# Patient Record
Sex: MALE | Race: WHITE | NOT HISPANIC OR LATINO | Employment: OTHER | URBAN - NONMETROPOLITAN AREA
[De-identification: names, ages, dates, MRNs, and addresses within clinical notes are randomized per-mention and may not be internally consistent; named-entity substitution may affect disease eponyms.]

---

## 2017-12-20 ENCOUNTER — TRANSFERRED RECORDS (OUTPATIENT)
Dept: HEALTH INFORMATION MANAGEMENT | Facility: HOSPITAL | Age: 67
End: 2017-12-20

## 2019-10-21 NOTE — PROGRESS NOTES
Subjective     Sammy Mccoy is a 69 year old male who presents to clinic today for the following health issues:    HPI   New Patient/Transfer of Care  Hyperlipidemia Follow-Up      Are you having any of the following symptoms? (Select all that apply)  No complaints of shortness of breath, chest pain or pressure.  No increased sweating or nausea with activity.  No left-sided neck or arm pain.  No complaints of pain in calves when walking 1-2 blocks.    Are you regularly taking any medication or supplement to lower your cholesterol?   Yes- Lipitor     Are you having muscle aches or other side effects that you think could be caused by your cholesterol lowering medication?  No      Hypertension Follow-up      Do you check your blood pressure regularly outside of the clinic? Yes     Are you following a low salt diet? No    Are your blood pressures ever more than 140 on the top number (systolic) OR more   than 90 on the bottom number (diastolic), for example 140/90? No    Sammy presents today to establish care.  He has no complaints today.  He has a history of HTN and hyperlipidemia along with mild neutropenia.    There is no problem list on file for this patient.    Past Surgical History:   Procedure Laterality Date     BIOPSY TONGUE       colonoscopy  12/20/2017 2002,2007,2012,2017- Dr. Holloway       PROSTATE WITH BIOPSY         Social History     Tobacco Use     Smoking status: Never Smoker     Smokeless tobacco: Never Used   Substance Use Topics     Alcohol use: Yes     Comment: weekly     Family History   Problem Relation Age of Onset     Coronary Artery Disease Mother 85     Alzheimer Disease Father 93         Current Outpatient Medications   Medication Sig Dispense Refill     aspirin (ASA) 81 MG chewable tablet Take 81 mg by mouth daily       Glucosamine HCl (GLUCOSAMINE PO) Take 2 tablets by mouth daily       lisinopril (PRINIVIL/ZESTRIL) 20 MG tablet Take 1 tablet (20 mg) by mouth daily 90 tablet 3      "simvastatin (ZOCOR) 20 MG tablet Take 1 tablet (20 mg) by mouth daily 90 tablet 3     No Known Allergies  BP Readings from Last 3 Encounters:   10/29/19 130/72    Wt Readings from Last 3 Encounters:   10/29/19 72.6 kg (160 lb)                 Reviewed and updated as needed this visit by Provider         Review of Systems   ROS COMP: Constitutional, HEENT, cardiovascular, pulmonary, GI, , musculoskeletal, neuro, skin, endocrine and psych systems are negative, except as otherwise noted.      Objective    /72 (BP Location: Left arm, Patient Position: Chair, Cuff Size: Adult Regular)   Pulse 79   Temp 97.7  F (36.5  C) (Tympanic)   Ht 1.689 m (5' 6.5\")   Wt 72.6 kg (160 lb)   SpO2 97%   BMI 25.44 kg/m    Body mass index is 25.44 kg/m .  Physical Exam   GENERAL: healthy, alert and no distress  EYES: Eyes grossly normal to inspection, PERRL and conjunctivae and sclerae normal  HENT: ear canals and TM's normal, nose and mouth without ulcers or lesions  NECK: no adenopathy, no asymmetry, masses, or scars and thyroid normal to palpation  RESP: lungs clear to auscultation - no rales, rhonchi or wheezes  CV: regular rate and rhythm, normal S1 S2, no S3 or S4, no murmur, click or rub, no peripheral edema and peripheral pulses strong  ABDOMEN: soft, nontender, no hepatosplenomegaly, no masses and bowel sounds normal  MS: no gross musculoskeletal defects noted, no edema  SKIN: no suspicious lesions or rashes  NEURO: Normal strength and tone, mentation intact and speech normal  PSYCH: mentation appears normal, affect normal/bright    Diagnostic Test Results:  No results found for this or any previous visit (from the past 24 hour(s)).  No results found for this or any previous visit.        Assessment & Plan   Problem List Items Addressed This Visit     None      Visit Diagnoses     Essential hypertension    -  Primary    Relevant Medications    lisinopril (PRINIVIL/ZESTRIL) 20 MG tablet    Hyperlipidemia LDL goal " <130        Relevant Medications    simvastatin (ZOCOR) 20 MG tablet    Other Relevant Orders    Comprehensive metabolic panel (Completed)    Lipid Profile (Chol, Trig, HDL, LDL calc) (Completed)    Screening for prostate cancer        Relevant Orders    PSA, screen (Completed)    Other neutropenia (H)        Relevant Orders    CBC with platelets and differential (Completed)               Linwood Russell DO  [facility]

## 2019-10-29 ENCOUNTER — TELEPHONE (OUTPATIENT)
Dept: INTERNAL MEDICINE | Facility: OTHER | Age: 69
End: 2019-10-29

## 2019-10-29 ENCOUNTER — OFFICE VISIT (OUTPATIENT)
Dept: INTERNAL MEDICINE | Facility: OTHER | Age: 69
End: 2019-10-29
Attending: INTERNAL MEDICINE
Payer: COMMERCIAL

## 2019-10-29 VITALS
HEIGHT: 67 IN | TEMPERATURE: 97.7 F | WEIGHT: 160 LBS | SYSTOLIC BLOOD PRESSURE: 130 MMHG | BODY MASS INDEX: 25.11 KG/M2 | HEART RATE: 79 BPM | OXYGEN SATURATION: 97 % | DIASTOLIC BLOOD PRESSURE: 72 MMHG

## 2019-10-29 DIAGNOSIS — Z12.5 SCREENING FOR PROSTATE CANCER: ICD-10-CM

## 2019-10-29 DIAGNOSIS — D70.9 NEUTROPENIA, UNSPECIFIED TYPE (H): Primary | ICD-10-CM

## 2019-10-29 DIAGNOSIS — D70.8 OTHER NEUTROPENIA (H): ICD-10-CM

## 2019-10-29 DIAGNOSIS — E78.5 HYPERLIPIDEMIA LDL GOAL <130: ICD-10-CM

## 2019-10-29 DIAGNOSIS — I10 ESSENTIAL HYPERTENSION: Primary | ICD-10-CM

## 2019-10-29 LAB
ALBUMIN SERPL-MCNC: 4.5 G/DL (ref 3.4–5)
ALP SERPL-CCNC: 63 U/L (ref 40–150)
ALT SERPL W P-5'-P-CCNC: 37 U/L (ref 0–70)
ANION GAP SERPL CALCULATED.3IONS-SCNC: 7 MMOL/L (ref 3–14)
AST SERPL W P-5'-P-CCNC: 31 U/L (ref 0–45)
BASOPHILS # BLD AUTO: 0.1 10E9/L (ref 0–0.2)
BASOPHILS NFR BLD AUTO: 1.7 %
BILIRUB SERPL-MCNC: 0.7 MG/DL (ref 0.2–1.3)
BUN SERPL-MCNC: 11 MG/DL (ref 7–30)
CALCIUM SERPL-MCNC: 9.6 MG/DL (ref 8.5–10.1)
CHLORIDE SERPL-SCNC: 103 MMOL/L (ref 94–109)
CHOLEST SERPL-MCNC: 198 MG/DL
CO2 SERPL-SCNC: 27 MMOL/L (ref 20–32)
CREAT SERPL-MCNC: 0.91 MG/DL (ref 0.66–1.25)
DIFFERENTIAL METHOD BLD: ABNORMAL
EOSINOPHIL # BLD AUTO: 0.2 10E9/L (ref 0–0.7)
EOSINOPHIL NFR BLD AUTO: 5.6 %
ERYTHROCYTE [DISTWIDTH] IN BLOOD BY AUTOMATED COUNT: 12 % (ref 10–15)
GFR SERPL CREATININE-BSD FRML MDRD: 87 ML/MIN/{1.73_M2}
GLUCOSE SERPL-MCNC: 117 MG/DL (ref 70–99)
HCT VFR BLD AUTO: 46 % (ref 40–53)
HDLC SERPL-MCNC: 93 MG/DL
HGB BLD-MCNC: 15.7 G/DL (ref 13.3–17.7)
LDLC SERPL CALC-MCNC: 96 MG/DL
LYMPHOCYTES # BLD AUTO: 1.2 10E9/L (ref 0.8–5.3)
LYMPHOCYTES NFR BLD AUTO: 40.2 %
MCH RBC QN AUTO: 31 PG (ref 26.5–33)
MCHC RBC AUTO-ENTMCNC: 34.1 G/DL (ref 31.5–36.5)
MCV RBC AUTO: 91 FL (ref 78–100)
MONOCYTES # BLD AUTO: 0.4 10E9/L (ref 0–1.3)
MONOCYTES NFR BLD AUTO: 12.6 %
NEUTROPHILS # BLD AUTO: 1.1 10E9/L (ref 1.6–8.3)
NEUTROPHILS NFR BLD AUTO: 39.9 %
NONHDLC SERPL-MCNC: 105 MG/DL
PLATELET # BLD AUTO: 185 10E9/L (ref 150–450)
POTASSIUM SERPL-SCNC: 4.2 MMOL/L (ref 3.4–5.3)
PROT SERPL-MCNC: 7.8 G/DL (ref 6.8–8.8)
PSA SERPL-ACNC: 0.88 UG/L (ref 0–4)
RBC # BLD AUTO: 5.07 10E12/L (ref 4.4–5.9)
SODIUM SERPL-SCNC: 137 MMOL/L (ref 133–144)
TRIGL SERPL-MCNC: 47 MG/DL
WBC # BLD AUTO: 2.9 10E9/L (ref 4–11)

## 2019-10-29 PROCEDURE — G0103 PSA SCREENING: HCPCS | Mod: ZL | Performed by: INTERNAL MEDICINE

## 2019-10-29 PROCEDURE — 80053 COMPREHEN METABOLIC PANEL: CPT | Mod: ZL | Performed by: INTERNAL MEDICINE

## 2019-10-29 PROCEDURE — 80061 LIPID PANEL: CPT | Mod: ZL | Performed by: INTERNAL MEDICINE

## 2019-10-29 PROCEDURE — 36415 COLL VENOUS BLD VENIPUNCTURE: CPT | Mod: ZL | Performed by: INTERNAL MEDICINE

## 2019-10-29 PROCEDURE — G0463 HOSPITAL OUTPT CLINIC VISIT: HCPCS

## 2019-10-29 PROCEDURE — 99204 OFFICE O/P NEW MOD 45 MIN: CPT | Performed by: INTERNAL MEDICINE

## 2019-10-29 PROCEDURE — 85025 COMPLETE CBC W/AUTO DIFF WBC: CPT | Mod: ZL | Performed by: INTERNAL MEDICINE

## 2019-10-29 RX ORDER — ASPIRIN 81 MG/1
81 TABLET, CHEWABLE ORAL DAILY
COMMUNITY

## 2019-10-29 RX ORDER — SIMVASTATIN 20 MG
20 TABLET ORAL DAILY
Qty: 90 TABLET | Refills: 3 | Status: SHIPPED | OUTPATIENT
Start: 2019-10-29 | End: 2020-11-30

## 2019-10-29 RX ORDER — LISINOPRIL 20 MG/1
20 TABLET ORAL DAILY
Qty: 90 TABLET | Refills: 3 | Status: SHIPPED | OUTPATIENT
Start: 2019-10-29 | End: 2020-11-30

## 2019-10-29 RX ORDER — LISINOPRIL 20 MG/1
20 TABLET ORAL DAILY
COMMUNITY
Start: 2017-02-28 | End: 2019-10-29

## 2019-10-29 RX ORDER — SIMVASTATIN 20 MG
20 TABLET ORAL DAILY
COMMUNITY
Start: 2012-08-07 | End: 2019-10-29

## 2019-10-29 ASSESSMENT — MIFFLIN-ST. JEOR: SCORE: 1451.45

## 2019-10-29 ASSESSMENT — PATIENT HEALTH QUESTIONNAIRE - PHQ9: SUM OF ALL RESPONSES TO PHQ QUESTIONS 1-9: 0

## 2019-10-29 ASSESSMENT — ANXIETY QUESTIONNAIRES
3. WORRYING TOO MUCH ABOUT DIFFERENT THINGS: NOT AT ALL
6. BECOMING EASILY ANNOYED OR IRRITABLE: NOT AT ALL
5. BEING SO RESTLESS THAT IT IS HARD TO SIT STILL: NOT AT ALL
4. TROUBLE RELAXING: NOT AT ALL
2. NOT BEING ABLE TO STOP OR CONTROL WORRYING: NOT AT ALL
1. FEELING NERVOUS, ANXIOUS, OR ON EDGE: NOT AT ALL
GAD7 TOTAL SCORE: 0
7. FEELING AFRAID AS IF SOMETHING AWFUL MIGHT HAPPEN: NOT AT ALL

## 2019-10-29 ASSESSMENT — PAIN SCALES - GENERAL: PAINLEVEL: NO PAIN (0)

## 2019-10-29 NOTE — NURSING NOTE
"Chief Complaint   Patient presents with     Establish Care       Initial /72 (BP Location: Left arm, Patient Position: Chair, Cuff Size: Adult Regular)   Pulse 79   Temp 97.7  F (36.5  C) (Tympanic)   Ht 1.689 m (5' 6.5\")   Wt 72.6 kg (160 lb)   SpO2 97%   BMI 25.44 kg/m   Estimated body mass index is 25.44 kg/m  as calculated from the following:    Height as of this encounter: 1.689 m (5' 6.5\").    Weight as of this encounter: 72.6 kg (160 lb).  Medication Reconciliation: complete     MIKHAIL RODRIGUEZ LPN      "

## 2019-10-29 NOTE — TELEPHONE ENCOUNTER
Patient notified of lab results, will see hematology to low WBC. Order pending   MIKHAIL RODRIGUEZ LPN

## 2019-10-30 ENCOUNTER — TRANSFERRED RECORDS (OUTPATIENT)
Dept: HEALTH INFORMATION MANAGEMENT | Facility: HOSPITAL | Age: 69
End: 2019-10-30

## 2019-10-30 ASSESSMENT — ANXIETY QUESTIONNAIRES: GAD7 TOTAL SCORE: 0

## 2019-10-31 ENCOUNTER — TELEPHONE (OUTPATIENT)
Dept: INTERNAL MEDICINE | Facility: OTHER | Age: 69
End: 2019-10-31

## 2019-10-31 NOTE — TELEPHONE ENCOUNTER
"3:18 PM    Reason for Call: Phone Call    Description: Pt is calling to set up an apt with a \"blood doctor\" is what he said. He couldn't give me more information on who and where. Pt was transferred to me and wasn't sure what  He is supposed to do. Please call pt with more specific information on what his next steps are    Was an appointment offered for this call? No  If yes : Appointment type              Date    Preferred method for responding to this message: Telephone Call  What is your phone number ?  [phone] Sammy    If we cannot reach you directly, may we leave a detailed response at the number you provided? Yes    Can this message wait until your PCP/provider returns, if available today? Not applicable, PCP is in    Danae Corbin    "

## 2019-10-31 NOTE — TELEPHONE ENCOUNTER
ONCOLOGY INTAKE: Records Information      APPT INFORMATION:  Referring provider:  Linwood Russell, DO  Referring provider s clinic:  [facility]  Reason for visit/diagnosis:  D70.9 (ICD-10-CM) - Neutropenia, unspecified type (H) Low WBC  Has patient been notified of appointment date and time?: No    RECORDS INFORMATION:  Were the records received with the referral (via [employer])? No, Internal Referral      ADDITIONAL INFORMATION:  LVM and Letter Sent

## 2019-11-01 NOTE — TELEPHONE ENCOUNTER
Spoke with OTILIO in Hematology. They will be reviewing his chart and calling him either Friday or Monday. Patient notified     MIKHAIL RODRIGUEZ LPN

## 2019-12-23 ENCOUNTER — ONCOLOGY VISIT (OUTPATIENT)
Dept: ONCOLOGY | Facility: OTHER | Age: 69
End: 2019-12-23
Attending: INTERNAL MEDICINE
Payer: MEDICARE

## 2019-12-23 VITALS
HEART RATE: 96 BPM | TEMPERATURE: 97.6 F | BODY MASS INDEX: 26.22 KG/M2 | RESPIRATION RATE: 20 BRPM | SYSTOLIC BLOOD PRESSURE: 138 MMHG | HEIGHT: 66 IN | OXYGEN SATURATION: 98 % | WEIGHT: 163.14 LBS | DIASTOLIC BLOOD PRESSURE: 80 MMHG

## 2019-12-23 DIAGNOSIS — R53.83 FATIGUE: Primary | ICD-10-CM

## 2019-12-23 DIAGNOSIS — D70.9 NEUTROPENIA, UNSPECIFIED TYPE (H): ICD-10-CM

## 2019-12-23 LAB
ALBUMIN SERPL-MCNC: 4.5 G/DL (ref 3.4–5)
ALP SERPL-CCNC: 60 U/L (ref 40–150)
ALT SERPL W P-5'-P-CCNC: 31 U/L (ref 0–70)
ANION GAP SERPL CALCULATED.3IONS-SCNC: 6 MMOL/L (ref 3–14)
AST SERPL W P-5'-P-CCNC: 25 U/L (ref 0–45)
BASOPHILS # BLD AUTO: 0 10E9/L (ref 0–0.2)
BASOPHILS NFR BLD AUTO: 0.9 %
BILIRUB SERPL-MCNC: 0.7 MG/DL (ref 0.2–1.3)
BUN SERPL-MCNC: 18 MG/DL (ref 7–30)
CALCIUM SERPL-MCNC: 9.6 MG/DL (ref 8.5–10.1)
CHLORIDE SERPL-SCNC: 104 MMOL/L (ref 94–109)
CO2 SERPL-SCNC: 28 MMOL/L (ref 20–32)
CREAT SERPL-MCNC: 0.79 MG/DL (ref 0.66–1.25)
DIFFERENTIAL METHOD BLD: NORMAL
EOSINOPHIL # BLD AUTO: 0.1 10E9/L (ref 0–0.7)
EOSINOPHIL NFR BLD AUTO: 3 %
ERYTHROCYTE [DISTWIDTH] IN BLOOD BY AUTOMATED COUNT: 11.7 % (ref 10–15)
ERYTHROCYTE [SEDIMENTATION RATE] IN BLOOD BY WESTERGREN METHOD: 4 MM/H (ref 0–20)
GFR SERPL CREATININE-BSD FRML MDRD: >90 ML/MIN/{1.73_M2}
GLUCOSE SERPL-MCNC: 126 MG/DL (ref 70–99)
HCT VFR BLD AUTO: 45.7 % (ref 40–53)
HGB BLD-MCNC: 15.9 G/DL (ref 13.3–17.7)
IMM GRANULOCYTES # BLD: 0 10E9/L (ref 0–0.4)
IMM GRANULOCYTES NFR BLD: 0 %
LDH SERPL L TO P-CCNC: 179 U/L (ref 85–227)
LYMPHOCYTES # BLD AUTO: 1 10E9/L (ref 0.8–5.3)
LYMPHOCYTES NFR BLD AUTO: 21.2 %
MCH RBC QN AUTO: 31.2 PG (ref 26.5–33)
MCHC RBC AUTO-ENTMCNC: 34.8 G/DL (ref 31.5–36.5)
MCV RBC AUTO: 90 FL (ref 78–100)
MONOCYTES # BLD AUTO: 0.4 10E9/L (ref 0–1.3)
MONOCYTES NFR BLD AUTO: 7.8 %
NEUTROPHILS # BLD AUTO: 3.1 10E9/L (ref 1.6–8.3)
NEUTROPHILS NFR BLD AUTO: 67.1 %
NRBC # BLD AUTO: 0 10*3/UL
NRBC BLD AUTO-RTO: 0 /100
PLATELET # BLD AUTO: 193 10E9/L (ref 150–450)
POTASSIUM SERPL-SCNC: 4 MMOL/L (ref 3.4–5.3)
PROT SERPL-MCNC: 7.9 G/DL (ref 6.8–8.8)
RBC # BLD AUTO: 5.09 10E12/L (ref 4.4–5.9)
RETICS # AUTO: 68.7 10E9/L (ref 25–95)
RETICS/RBC NFR AUTO: 1.4 % (ref 0.5–2)
SODIUM SERPL-SCNC: 138 MMOL/L (ref 133–144)
TSH SERPL DL<=0.005 MIU/L-ACNC: 1.82 MU/L (ref 0.4–4)
WBC # BLD AUTO: 4.6 10E9/L (ref 4–11)

## 2019-12-23 PROCEDURE — 86664 EPSTEIN-BARR NUCLEAR ANTIGEN: CPT | Mod: ZL | Performed by: INTERNAL MEDICINE

## 2019-12-23 PROCEDURE — 85652 RBC SED RATE AUTOMATED: CPT | Mod: ZL | Performed by: INTERNAL MEDICINE

## 2019-12-23 PROCEDURE — 82784 ASSAY IGA/IGD/IGG/IGM EACH: CPT | Mod: ZL | Performed by: INTERNAL MEDICINE

## 2019-12-23 PROCEDURE — 83615 LACTATE (LD) (LDH) ENZYME: CPT | Mod: ZL | Performed by: INTERNAL MEDICINE

## 2019-12-23 PROCEDURE — G0463 HOSPITAL OUTPT CLINIC VISIT: HCPCS

## 2019-12-23 PROCEDURE — 82746 ASSAY OF FOLIC ACID SERUM: CPT | Mod: ZL | Performed by: INTERNAL MEDICINE

## 2019-12-23 PROCEDURE — 84443 ASSAY THYROID STIM HORMONE: CPT | Mod: ZL | Performed by: INTERNAL MEDICINE

## 2019-12-23 PROCEDURE — 40000847 ZZHCL STATISTIC MORPHOLOGY W/INTERP HISTOLOGY TC 85060: Mod: ZL | Performed by: INTERNAL MEDICINE

## 2019-12-23 PROCEDURE — 36415 COLL VENOUS BLD VENIPUNCTURE: CPT | Mod: ZL | Performed by: INTERNAL MEDICINE

## 2019-12-23 PROCEDURE — 85025 COMPLETE CBC W/AUTO DIFF WBC: CPT | Mod: ZL | Performed by: INTERNAL MEDICINE

## 2019-12-23 PROCEDURE — 86665 EPSTEIN-BARR CAPSID VCA: CPT | Mod: ZL | Performed by: INTERNAL MEDICINE

## 2019-12-23 PROCEDURE — 86431 RHEUMATOID FACTOR QUANT: CPT | Mod: ZL | Performed by: INTERNAL MEDICINE

## 2019-12-23 PROCEDURE — 85045 AUTOMATED RETICULOCYTE COUNT: CPT | Mod: ZL | Performed by: INTERNAL MEDICINE

## 2019-12-23 PROCEDURE — 82607 VITAMIN B-12: CPT | Mod: ZL | Performed by: INTERNAL MEDICINE

## 2019-12-23 PROCEDURE — 86038 ANTINUCLEAR ANTIBODIES: CPT | Mod: ZL | Performed by: INTERNAL MEDICINE

## 2019-12-23 PROCEDURE — 00000402 ZZHCL STATISTIC TOTAL PROTEIN: Mod: ZL | Performed by: INTERNAL MEDICINE

## 2019-12-23 PROCEDURE — 84165 PROTEIN E-PHORESIS SERUM: CPT | Mod: ZL | Performed by: INTERNAL MEDICINE

## 2019-12-23 PROCEDURE — 80053 COMPREHEN METABOLIC PANEL: CPT | Mod: ZL | Performed by: INTERNAL MEDICINE

## 2019-12-23 PROCEDURE — 99203 OFFICE O/P NEW LOW 30 MIN: CPT | Performed by: INTERNAL MEDICINE

## 2019-12-23 PROCEDURE — 86334 IMMUNOFIX E-PHORESIS SERUM: CPT | Mod: ZL | Performed by: INTERNAL MEDICINE

## 2019-12-23 ASSESSMENT — PATIENT HEALTH QUESTIONNAIRE - PHQ9: SUM OF ALL RESPONSES TO PHQ QUESTIONS 1-9: 0

## 2019-12-23 ASSESSMENT — PAIN SCALES - GENERAL: PAINLEVEL: NO PAIN (0)

## 2019-12-23 ASSESSMENT — MIFFLIN-ST. JEOR: SCORE: 1461.72

## 2019-12-23 NOTE — NURSING NOTE
"Chief Complaint   Patient presents with     Consult     Consult for neutropenia        Initial /80   Pulse 96   Temp 97.6  F (36.4  C) (Tympanic)   Resp 20   Ht 1.683 m (5' 6.25\")   Wt 74 kg (163 lb 2.3 oz)   SpO2 98%   BMI 26.13 kg/m   Estimated body mass index is 26.13 kg/m  as calculated from the following:    Height as of this encounter: 1.683 m (5' 6.25\").    Weight as of this encounter: 74 kg (163 lb 2.3 oz).  Medication Reconciliation: complete.  Immunizations and advance directives status reviewed. Pain scale =0 , PHQ-9 =0.    Patient was assessed using the NCCN psychosocial distress thermometer. Patient rated the score as a 3. Patient rated current stressors as seeing DR today. Stressors will be brought to the attention of provider or Oncology RN Care Coordinator for a score of 6 or greater or per nurses discretion.                     Urvashi Neff LPN    "

## 2019-12-24 LAB
FOLATE SERPL-MCNC: 38.8 NG/ML
VIT B12 SERPL-MCNC: 488 PG/ML (ref 193–986)

## 2019-12-24 NOTE — PROGRESS NOTES
Visit Date:   12/23/2019      HEMATOLOGY/ONCOLOGY CONSULTATION      REASON FOR CONSULTATION:  Leukopenia.      REQUESTING PHYSICIAN:  Linwood Russell DO      HISTORY OF PRESENT ILLNESS:  Mr. Mccoy is a 69-year-old white male with a history of hypertension, hyperlipidemia with a previous history of neutropenia.  He had originally been evaluated by a previous doctor, Dr. Perez, for neutropenia who referred the patient to Dr. Maurice Hughes hematologist/oncologist at [facility] who saw the patient on 05/31/2016.  At that time his white count was 3.2 with an absolute neutrophil count of 1.2.  He recommended bone marrow aspiration biopsy and this came back totally normal.  There was no abnormal cytogenetics, no abnormal flow cytometry.  FISH results were all negative for MDS.  Dr. Hughes felt that this was a benign condition; he called it benign chronic neutropenia and felt that this can wax and wane between normal white counts and borderline mild neutropenic counts.  The patient apparently established care with Dr. Russell as his new primary care physician and a CBC was drawn on 10/29/2019.  The white count was 2.9 with a neutrophil count of 1.1, H and H 15.7 and 46.0, platelet count was 185.  The patient is essentially asymptomatic.  Denies any fevers, night sweats, weight loss.  There are no reports of fatigue, malaise, joint pains.      PAST MEDICAL HISTORY:  Significant for hyperlipidemia, hypertension, benign colon polyps, prostate nodule with normal biopsy.      ALLERGIES:  HE HAS NO KNOWN DRUG ALLERGIES.      MEDICATIONS:   1.  He is on Lisinopril 20 mg daily.   2.  Zocor 20 mg daily.   3.  Aspirin 81 mg daily.   4.  Glucosamine 2 tablets by mouth daily.      SOCIAL HISTORY:  Tobacco is negative.  Alcohol is negative.  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      FAMILY HISTORY:  Noncontributory.      REVIEW OF SYSTEMS:   CENTRAL NERVOUS SYSTEM:  Negative for headache,  change in mental status.   ENT:  Negative for hearing loss.   RESPIRATORY:  Negative for shortness of breath, cough, hemoptysis.   CARDIAC:  Negative chest pain, palpitations, orthopnea, PND, ankle edema.   GASTROINTESTINAL:  Negative for change in bowel habits, bright red blood per rectum, constipation.   MUSCULOSKELETAL:  Unremarkable.   GENITOURINARY:  Negative for nocturia, urgency, frequency.   HEMATOLOGIC:  Negative for easy bruisability, epistaxis.      PHYSICAL EXAMINATION:   GENERAL:  He is a middle-aged white male in no acute distress.   VITAL SIGNS:  Blood pressure 130/80, pulse 76, respirations 20, temperature 97.6.   HEENT:  Atraumatic, normocephalic.  Oropharynx nonerythematous.   NECK:  Supple, no thyromegaly.   LUNGS:  Clear to auscultation and percussion.   HEART:  Regular rhythm, S1, S2 normal.   ABDOMEN:  Soft, normoactive bowel sounds.  No mass, nontender.   LYMPHATICS:  No cervical, supraclavicular, axillary, inguinal nodes.   EXTREMITIES:  Without edema.   NEUROLOGIC:  Nonfocal.      LABORATORY DATA:  Laboratories reveal CBC:  White count 4.6, H and H 15.9 and 45.7, platelet count 193.  Neutrophil count is 3.1.  BUN 18, creatinine 0.79.  LFTs are normal.      IMPRESSION:  Chronic benign neutropenia.  The patient had a previous bone marrow in 2016, which was negative.  Given the fact he has had neutropenia in the past, we would like to complete the workup by reviewing the peripheral blood smear, obtaining sed rate, rheumatoid factor, LUCIO, serum protein electrophoresis, B12, folic acid, retic count, TSH, Jeny-Barr virus profile.  Also obtain a CT abdomen and pelvis to rule out splenomegaly.  Otherwise, if the above workup is negative the patient likely has chronic benign neutropenia and may have periods where his white count drops but it appears to be a cyclic finding and no further workup will be necessary.      Seventy minutes was spent with the patient, greater than half the time was spent  in counseling and coordination of care.         JUANCARLOS REAL MD             D: 2019   T: 2019   MT: LILLIAN      Name:     CARRI RILEY   MRN:      [id number]        Account:      [id number]   :      1950           Visit Date:   2019      Document: [id number]       cc: Linwood Russell DO

## 2019-12-26 LAB
ALBUMIN SERPL ELPH-MCNC: 4.9 G/DL (ref 3.7–5.1)
ALPHA1 GLOB SERPL ELPH-MCNC: 0.3 G/DL (ref 0.2–0.4)
ALPHA2 GLOB SERPL ELPH-MCNC: 0.6 G/DL (ref 0.5–0.9)
ANA SER QL IF: NEGATIVE
B-GLOBULIN SERPL ELPH-MCNC: 0.8 G/DL (ref 0.6–1)
COPATH REPORT: NORMAL
EBV NA IGG SER QL IA: >8 AI (ref 0–0.8)
EBV VCA IGG SER QL IA: >8 AI (ref 0–0.8)
EBV VCA IGM SER QL IA: <0.2 AI (ref 0–0.8)
GAMMA GLOB SERPL ELPH-MCNC: 0.8 G/DL (ref 0.7–1.6)
IGA SERPL-MCNC: 258 MG/DL (ref 84–499)
IGG SERPL-MCNC: 882 MG/DL (ref 610–1616)
IGM SERPL-MCNC: 55 MG/DL (ref 35–242)
M PROTEIN SERPL ELPH-MCNC: 0 G/DL
PROT PATTERN SERPL ELPH-IMP: NORMAL
PROT PATTERN SERPL IFE-IMP: NORMAL
RHEUMATOID FACT SER NEPH-ACNC: 10 IU/ML (ref 0–20)

## 2020-01-06 ENCOUNTER — HOSPITAL ENCOUNTER (OUTPATIENT)
Dept: CT IMAGING | Facility: HOSPITAL | Age: 70
Discharge: HOME | End: 2020-01-06
Attending: INTERNAL MEDICINE | Admitting: INTERNAL MEDICINE
Payer: MEDICARE

## 2020-01-06 DIAGNOSIS — D70.9 NEUTROPENIA, UNSPECIFIED TYPE (H): ICD-10-CM

## 2020-01-06 DIAGNOSIS — R53.83 FATIGUE: ICD-10-CM

## 2020-01-06 PROCEDURE — 25500064 ZZH RX 255 OP 636: Performed by: RADIOLOGY

## 2020-01-06 PROCEDURE — 74177 CT ABD & PELVIS W/CONTRAST: CPT | Mod: TC

## 2020-01-06 RX ORDER — IOPAMIDOL 612 MG/ML
100 INJECTION, SOLUTION INTRAVASCULAR ONCE
Status: COMPLETED | OUTPATIENT
Start: 2020-01-06 | End: 2020-01-06

## 2020-01-06 RX ADMIN — DIATRIZOATE MEGLUMINE AND DIATRIZOATE SODIUM 30 ML: 660; 100 SOLUTION ORAL; RECTAL at 11:38

## 2020-01-06 RX ADMIN — IOPAMIDOL 100 ML: 612 INJECTION, SOLUTION INTRAVENOUS at 11:39

## 2020-01-27 ENCOUNTER — ONCOLOGY VISIT (OUTPATIENT)
Dept: ONCOLOGY | Facility: OTHER | Age: 70
End: 2020-01-27
Attending: INTERNAL MEDICINE
Payer: COMMERCIAL

## 2020-01-27 VITALS
HEART RATE: 71 BPM | HEIGHT: 66 IN | SYSTOLIC BLOOD PRESSURE: 140 MMHG | WEIGHT: 158.95 LBS | BODY MASS INDEX: 25.55 KG/M2 | TEMPERATURE: 97.3 F | DIASTOLIC BLOOD PRESSURE: 70 MMHG | OXYGEN SATURATION: 98 %

## 2020-01-27 DIAGNOSIS — D70.9 NEUTROPENIA, UNSPECIFIED TYPE (H): Primary | ICD-10-CM

## 2020-01-27 PROCEDURE — G0463 HOSPITAL OUTPT CLINIC VISIT: HCPCS

## 2020-01-27 PROCEDURE — 99213 OFFICE O/P EST LOW 20 MIN: CPT | Performed by: INTERNAL MEDICINE

## 2020-01-27 ASSESSMENT — MIFFLIN-ST. JEOR: SCORE: 1438.75

## 2020-01-27 NOTE — PROGRESS NOTES
"Chief Complaint   Patient presents with     RECHECK     Neutropenia       Initial BP (!) 140/70 (Patient Position: Sitting)   Pulse 71   Temp 97.3  F (36.3  C)   Ht 1.676 m (5' 6\")   Wt 72.1 kg (158 lb 15.2 oz)   SpO2 98%   BMI 25.66 kg/m   Estimated body mass index is 25.66 kg/m  as calculated from the following:    Height as of this encounter: 1.676 m (5' 6\").    Weight as of this encounter: 72.1 kg (158 lb 15.2 oz).  Medication Reconciliation:done     Wendy Romero LPN    "

## 2020-01-27 NOTE — PROGRESS NOTES
Visit Date:   01/27/2020      HISTORY OF PRESENT ILLNESS:  Mr. Mccoy returns for followup of leukopenia.  We had seen him in consultation at the request of Dr. Russell on 12/23/2019.  At that time he was a 69-year-old white male with a history of hypertension and hyperlipidemia with a previous history of neutropenia.  He had originally been evaluated by his previous doctor, Dr. Perez, for neutropenia.  We referred the patient to Dr. Maurice Hughes, hematologist/oncologist, and he saw the patient 05/31/2016.  At that time, his white count was 3.2 with absolute neutrophil count of 1.2.  He recommended a bone marrow aspiration biopsy.  This came back totally normal.  There was no abnormal cytogenetics, normal flow cytometry.  FISH results were all negative for MDS.  Dr. Hughes felt this was a benign condition.  He called it benign chronic neutropenia, but this can wax and wane between normal white counts and borderline neutropenic counts.  The patient apparently established care with Dr. Russell as his new primary care physician.  A CBC was drawn 10/29/2019.  White count was 2.9, neutrophil count 1.1, H and H 15.7 and 46.  Platelet count was 185.  The patient was essentially asymptomatic.  When we saw the patient, we wanted to rule out other causes of neutropenia.  We obtained a sed rate, rheumatoid factor, LUCIO which was negative.  EBV profile was negative.  B12 and folic acid was normal.  CT of the abdomen and pelvis was negative for splenomegaly.  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  The patient otherwise denies any left hip pain.  He denies any shortness of breath, chest pain, abdominal pain, fevers, night sweats, weight loss.      PHYSICAL EXAMINATION:   GENERAL:  He is a middle-aged white male in no acute distress.   VITAL SIGNS:  Blood pressure 140/70, pulse 71, temperature 97.3.   HEENT:  Atraumatic, normocephalic.  Oropharynx is nonerythematous.    NECK:  Supple.   LUNGS:  Clear to auscultation and percussion.   HEART:  Regular rhythm, S1, S2 normal.   ABDOMEN:  Soft, normoactive bowel sounds.  No mass, nontender.   LYMPHATICS:  No cervical, supraclavicular, axillary, or inguinal nodes.   EXTREMITIES:  No edema.   NEUROLOGIC:  Grossly focal.      LABORATORY DATA:  Laboratories reveal CBC with white count of 4.6, H and H 15.9 and 45.7, platelet count is 193.      IMPRESSION:  Chronic benign neutropenia.  White count is now normal.  Bone marrow in the past was negative.  The patient likely has a benign process that may have occasional leukopenia, but otherwise his white count currently is normal.  His hemoglobin is normal, hematocrit is normal, platelet count is normal.  No need for further workup.  He will return to clinic p.r.n.         JUANCARLOS REAL MD             D: 2020   T: 2020   MT: CAMELIA      Name:     CARRI RILEY   MRN:      [id number]        Account:      [id number]   :      1950           Visit Date:   2020      Document: [id number]       cc: Linwood Russell DO

## 2020-10-26 NOTE — PROGRESS NOTES
Subjective     Sammy Mccoy is a 70 year old male who presents to clinic today for the following health issues:    HPI         Hyperlipidemia Follow-Up      Are you regularly taking any medication or supplement to lower your cholesterol?   Yes- Zocor    Are you having muscle aches or other side effects that you think could be caused by your cholesterol lowering medication?  No    Hypertension Follow-up      Do you check your blood pressure regularly outside of the clinic? Yes     Are you following a low salt diet? Yes    Are your blood pressures ever more than 140 on the top number (systolic) OR more   than 90 on the bottom number (diastolic), for example 140/90? No      Sammy presents today for follow up of his HTN, hyperlipidemia and chronic benign neutropenia.  He denies any chest pain or SOB.  Denies any fevers or ill contacts.  He states he generally feels well.            Review of Systems   Constitutional, HEENT, cardiovascular, pulmonary, gi and gu systems are negative, except as otherwise noted.      Objective    There were no vitals taken for this visit.  There is no height or weight on file to calculate BMI.  Physical Exam   GENERAL: healthy, alert and no distress  HENT: ear canals and TM's normal, nose and mouth without ulcers or lesions  NECK: no adenopathy, no asymmetry, masses, or scars and thyroid normal to palpation  RESP: lungs clear to auscultation - no rales, rhonchi or wheezes  CV: regular rate and rhythm, normal S1 S2, no S3 or S4, no murmur, click or rub, no peripheral edema and peripheral pulses strong  ABDOMEN: soft, nontender, no hepatosplenomegaly, no masses and bowel sounds normal  MS: no gross musculoskeletal defects noted, no edema  SKIN: no suspicious lesions or rashes  PSYCH: mentation appears normal, affect normal/bright    No results found for this or any previous visit (from the past 24 hour(s)).  No results found for any visits on 10/27/20.  Oncology Visit on 12/23/2019    Component Date Value Ref Range Status     WBC 12/23/2019 4.6  4.0 - 11.0 10e9/L Final     RBC Count 12/23/2019 5.09  4.4 - 5.9 10e12/L Final     Hemoglobin 12/23/2019 15.9  13.3 - 17.7 g/dL Final     Hematocrit 12/23/2019 45.7  40.0 - 53.0 % Final     MCV 12/23/2019 90  78 - 100 fl Final     MCH 12/23/2019 31.2  26.5 - 33.0 pg Final     MCHC 12/23/2019 34.8  31.5 - 36.5 g/dL Final     RDW 12/23/2019 11.7  10.0 - 15.0 % Final     Platelet Count 12/23/2019 193  150 - 450 10e9/L Final     Diff Method 12/23/2019 Automated Method   Final     % Neutrophils 12/23/2019 67.1  % Final     % Lymphocytes 12/23/2019 21.2  % Final     % Monocytes 12/23/2019 7.8  % Final     % Eosinophils 12/23/2019 3.0  % Final     % Basophils 12/23/2019 0.9  % Final     % Immature Granulocytes 12/23/2019 0.0  % Final     Nucleated RBCs 12/23/2019 0  0 /100 Final     Absolute Neutrophil 12/23/2019 3.1  1.6 - 8.3 10e9/L Final     Absolute Lymphocytes 12/23/2019 1.0  0.8 - 5.3 10e9/L Final     Absolute Monocytes 12/23/2019 0.4  0.0 - 1.3 10e9/L Final     Absolute Eosinophils 12/23/2019 0.1  0.0 - 0.7 10e9/L Final     Absolute Basophils 12/23/2019 0.0  0.0 - 0.2 10e9/L Final     Abs Immature Granulocytes 12/23/2019 0.0  0 - 0.4 10e9/L Final     Absolute Nucleated RBC 12/23/2019 0.0   Final     Sodium 12/23/2019 138  133 - 144 mmol/L Final     Potassium 12/23/2019 4.0  3.4 - 5.3 mmol/L Final     Chloride 12/23/2019 104  94 - 109 mmol/L Final     Carbon Dioxide 12/23/2019 28  20 - 32 mmol/L Final     Anion Gap 12/23/2019 6  3 - 14 mmol/L Final     Glucose 12/23/2019 126* 70 - 99 mg/dL Final     Urea Nitrogen 12/23/2019 18  7 - 30 mg/dL Final     Creatinine 12/23/2019 0.79  0.66 - 1.25 mg/dL Final     GFR Estimate 12/23/2019 >90  >60 mL/min/[1.73_m2] Final    Comment: Non  GFR Calc  Starting 12/18/2018, serum creatinine based estimated GFR (eGFR) will be   calculated using the Chronic Kidney Disease Epidemiology Collaboration    (CKD-EPI) equation.       GFR Estimate If Black 12/23/2019 >90  >60 mL/min/[1.73_m2] Final    Comment:  GFR Calc  Starting 12/18/2018, serum creatinine based estimated GFR (eGFR) will be   calculated using the Chronic Kidney Disease Epidemiology Collaboration   (CKD-EPI) equation.       Calcium 12/23/2019 9.6  8.5 - 10.1 mg/dL Final     Bilirubin Total 12/23/2019 0.7  0.2 - 1.3 mg/dL Final     Albumin 12/23/2019 4.5  3.4 - 5.0 g/dL Final     Protein Total 12/23/2019 7.9  6.8 - 8.8 g/dL Final     Alkaline Phosphatase 12/23/2019 60  40 - 150 U/L Final     ALT 12/23/2019 31  0 - 70 U/L Final     AST 12/23/2019 25  0 - 45 U/L Final     Sed Rate 12/23/2019 4  0 - 20 mm/h Final     Lactate Dehydrogenase 12/23/2019 179  85 - 227 U/L Final     LUCIO interpretation 12/23/2019 Negative  NEG^Negative Final    Comment:                                    Reference range:  <1:40  NEGATIVE  1:40 - 1:80  BORDERLINE POSITIVE  >1:80 POSITIVE       Rheumatoid Factor 12/23/2019 10  <12 IU/mL Final     Folate 12/23/2019 38.8  >5.4 ng/mL Final     Vitamin B12 12/23/2019 488  193 - 986 pg/mL Final     Albumin Fraction 12/23/2019 4.9  3.7 - 5.1 g/dL Final     Alpha 1 Fraction 12/23/2019 0.3  0.2 - 0.4 g/dL Final     Alpha 2 Fraction 12/23/2019 0.6  0.5 - 0.9 g/dL Final     Beta Fraction 12/23/2019 0.8  0.6 - 1.0 g/dL Final     Gamma Fraction 12/23/2019 0.8  0.7 - 1.6 g/dL Final     Monoclonal Peak 12/23/2019 0.0  0.0 g/dL Final     ELP Interpretation: 12/23/2019    Final                    Value:Essentially normal electrophoretic pattern.  No monoclonal protein seen. Pathologic   significance requires clinical correlation.  MER Mcgregor M.D., Ph.D., Pathologist ([phone]).       Immunofixation ELP 12/23/2019    Final                    Value:No monoclonal protein seen on immunofixation.  Pathological significance requires clinical   correlation.      Comment: MER Mcgregor M.D., Ph.D  Pathologist ([phone])        IGG 12/23/2019 882  610 - 1,616 mg/dL Final     IGA 12/23/2019 258  84 - 499 mg/dL Final     IGM 12/23/2019 55  35 - 242 mg/dL Final     EBV Capsid Antibody IgM 12/23/2019 <0.2  0.0 - 0.8 AI Final    Comment: No detectable antibody.  Antibody index (AI) values reflect qualitative changes in antibody   concentration that cannot be directly associated with clinical condition or   disease state.       EBV Capsid Antibody IgG 12/23/2019 >8.0* 0.0 - 0.8 AI Final    Comment: Positive, suggests recent or past exposure  Antibody index (AI) values reflect qualitative changes in antibody   concentration that cannot be directly associated with clinical condition or   disease state.       EBV Nuclear Antigen (EBNA) Antibod* 12/23/2019 >8.0* 0.0 - 0.8 AI Final    Comment: Positive, suggests convalescent phase or past exposure  Antibody index (AI) values reflect qualitative changes in antibody   concentration that cannot be directly associated with clinical condition or   disease state.       TSH 12/23/2019 1.82  0.40 - 4.00 mU/L Final     Copath Report 12/23/2019    Final                    Value:Patient Name: CARRI RILEY  MR#: [id number]  Specimen #: [id number]  Collected: 12/23/2019  Received: 12/26/2019  Reported: 12/26/2019 15:44  Ordering Phy(s): JUANCARLOS REAL    For improved result formatting, select 'View Enhanced Report Format' under   Linked Documents section.    TEST(S):  Peripheral Blood Morphology    FINAL DIAGNOSIS:  Peripheral morphology  - No significant findings    Electronically signed out by:    Delfino Wheeler M.D.    PERIPHERAL BLOOD DATA:  Red cells: The red cells are normal in number, normochromic, and   normocytic.  Polychromasia is not increased.  Rouleaux formation is within normal limits.    Platelets: The platelets are normal in number and morphology.    Leukocytes: The leukocytes are unremarkable.    CLINICAL LAB RESULTS:  Battery Order No. Lab Test Code Clinical Result Ref. Range Units  Result   Date  Hemogram/Diff/PLT A30646 HI WBC Count 4.6 4.0-11.0 10e9/L 12/23/2019 12:12       RBC Count 5.09 4.4-5.9 10e12/L 12/23/2019 12:12       Hemoglobi                          n 15.9 13.3-17.7 g/dL 12/23/2019 12:12       Hematocrit 45.7 40.0-53.0 % 12/23/2019 12:12       MCV 90  fl 12/23/2019 12:12       MCH 31.2 26.5-33.0 pg 12/23/2019 12:12       MCHC 34.8 31.5-36.5 g/dL 12/23/2019 12:12       RDW 11.7 10.0-15.0 % 12/23/2019 12:12       Platelet Count 193 150-450 10e9/L 12/23/2019 12:12        SEE TEXT   12/23/2019 12:12       Text/Comments:  Automated Method       % Neutrophils 67.1  % 12/23/2019 12:12       % Lymphocytes 21.2  % 12/23/2019 12:12       % Monocytes 7.8  % 12/23/2019 12:12       % Eosinophils 3.0  % 12/23/2019 12:12       % Basophils 0.9  % 12/23/2019 12:12       % Immature Grans 0.0  % 12/23/2019 12:12       Nucleated RBCs 0 0 /100 12/23/2019 12:12       abs Neutrophils 3.1 1.6-8.3 10e9/L 12/23/2019 12:12       abs Lymphocytes 1.0 0.8-5.3 10e9/L 12/23/2019 12:12       abs Monocytes 0.4 0.0-1.3 10e9/L 12/23/2019 12:12       abs Eosinophils 0.1 0.0-0.7 10e9/L 12/23/2019 12:12       abs Basophils 0.0 0.0-0.2 10e9/L 12/23/2019 12:12                                 abs Imm Granulocytes 0.0 0-0.4 10e9/L 12/23/2019 12:12       abs NRBC 0.0   12/23/2019 12:12    Comprehensive Bat   Potassium 4.0 3.4-5.3 mmol/L 12/23/2019 12:21       Bilirubin,Total 0.7 0.2-1.3 mg/dL 12/23/2019 12:28       Protein, Total 7.9 6.8-8.8 g/dL 12/23/2019 12:28    Retic   Retic % 1.4 0.5-2.0 % 12/23/2019 12:12    CPT Codes:  A: 71321-XXRI    TESTING LAB LOCATION:  [address] [address]  [phone]    COLLECTION SITE:  Client:  [facility]  Location:  ON (B)       % Retic 12/23/2019 1.4  0.5 - 2.0 % Final     Absolute Retic 12/23/2019 68.7  25 - 95 10e9/L Final           Assessment & Plan   Problem List Items Addressed This Visit     None      Visit  Diagnoses     Essential hypertension    -  Primary    Relevant Orders    Comprehensive metabolic panel (BMP + Alb, Alk Phos, ALT, AST, Total. Bili, TP)    CBC with platelets and differential    Hyperlipidemia LDL goal <130        Relevant Orders    Lipid Profile (Chol, Trig, HDL, LDL calc)    Screening for prostate cancer        Relevant Orders    PSA, screen             Linwood Russell,   [facility]

## 2020-10-27 ENCOUNTER — OFFICE VISIT (OUTPATIENT)
Dept: INTERNAL MEDICINE | Facility: OTHER | Age: 70
End: 2020-10-27
Attending: INTERNAL MEDICINE
Payer: MEDICARE

## 2020-10-27 VITALS
HEART RATE: 66 BPM | OXYGEN SATURATION: 98 % | BODY MASS INDEX: 25.99 KG/M2 | SYSTOLIC BLOOD PRESSURE: 132 MMHG | DIASTOLIC BLOOD PRESSURE: 76 MMHG | TEMPERATURE: 97.3 F | WEIGHT: 161 LBS

## 2020-10-27 DIAGNOSIS — Z12.5 SCREENING FOR PROSTATE CANCER: ICD-10-CM

## 2020-10-27 DIAGNOSIS — E78.5 HYPERLIPIDEMIA LDL GOAL <130: ICD-10-CM

## 2020-10-27 DIAGNOSIS — I10 ESSENTIAL HYPERTENSION: Primary | ICD-10-CM

## 2020-10-27 LAB
ALBUMIN SERPL-MCNC: 4.2 G/DL (ref 3.4–5)
ALP SERPL-CCNC: 61 U/L (ref 40–150)
ALT SERPL W P-5'-P-CCNC: 28 U/L (ref 0–70)
ANION GAP SERPL CALCULATED.3IONS-SCNC: 4 MMOL/L (ref 3–14)
AST SERPL W P-5'-P-CCNC: 20 U/L (ref 0–45)
BASOPHILS # BLD AUTO: 0 10E9/L (ref 0–0.2)
BASOPHILS NFR BLD AUTO: 1.1 %
BILIRUB SERPL-MCNC: 0.7 MG/DL (ref 0.2–1.3)
BUN SERPL-MCNC: 13 MG/DL (ref 7–30)
CALCIUM SERPL-MCNC: 9.3 MG/DL (ref 8.5–10.1)
CHLORIDE SERPL-SCNC: 104 MMOL/L (ref 94–109)
CHOLEST SERPL-MCNC: 190 MG/DL
CO2 SERPL-SCNC: 29 MMOL/L (ref 20–32)
CREAT SERPL-MCNC: 0.86 MG/DL (ref 0.66–1.25)
DIFFERENTIAL METHOD BLD: ABNORMAL
EOSINOPHIL # BLD AUTO: 0.2 10E9/L (ref 0–0.7)
EOSINOPHIL NFR BLD AUTO: 6.7 %
ERYTHROCYTE [DISTWIDTH] IN BLOOD BY AUTOMATED COUNT: 11.9 % (ref 10–15)
GFR SERPL CREATININE-BSD FRML MDRD: 89 ML/MIN/{1.73_M2}
GLUCOSE SERPL-MCNC: 112 MG/DL (ref 70–99)
HCT VFR BLD AUTO: 43.7 % (ref 40–53)
HDLC SERPL-MCNC: 91 MG/DL
HGB BLD-MCNC: 14.9 G/DL (ref 13.3–17.7)
LDLC SERPL CALC-MCNC: 87 MG/DL
LYMPHOCYTES # BLD AUTO: 1.4 10E9/L (ref 0.8–5.3)
LYMPHOCYTES NFR BLD AUTO: 38.2 %
MCH RBC QN AUTO: 31.4 PG (ref 26.5–33)
MCHC RBC AUTO-ENTMCNC: 34.1 G/DL (ref 31.5–36.5)
MCV RBC AUTO: 92 FL (ref 78–100)
MONOCYTES # BLD AUTO: 0.4 10E9/L (ref 0–1.3)
MONOCYTES NFR BLD AUTO: 10.3 %
NEUTROPHILS # BLD AUTO: 1.6 10E9/L (ref 1.6–8.3)
NEUTROPHILS NFR BLD AUTO: 43.7 %
NONHDLC SERPL-MCNC: 99 MG/DL
PLATELET # BLD AUTO: 194 10E9/L (ref 150–450)
POTASSIUM SERPL-SCNC: 4.2 MMOL/L (ref 3.4–5.3)
PROT SERPL-MCNC: 7.5 G/DL (ref 6.8–8.8)
RBC # BLD AUTO: 4.75 10E12/L (ref 4.4–5.9)
SODIUM SERPL-SCNC: 137 MMOL/L (ref 133–144)
TRIGL SERPL-MCNC: 61 MG/DL
WBC # BLD AUTO: 3.6 10E9/L (ref 4–11)

## 2020-10-27 PROCEDURE — 36415 COLL VENOUS BLD VENIPUNCTURE: CPT | Mod: ZL | Performed by: INTERNAL MEDICINE

## 2020-10-27 PROCEDURE — 80053 COMPREHEN METABOLIC PANEL: CPT | Mod: ZL | Performed by: INTERNAL MEDICINE

## 2020-10-27 PROCEDURE — 80061 LIPID PANEL: CPT | Mod: ZL | Performed by: INTERNAL MEDICINE

## 2020-10-27 PROCEDURE — 85025 COMPLETE CBC W/AUTO DIFF WBC: CPT | Mod: ZL | Performed by: INTERNAL MEDICINE

## 2020-10-27 PROCEDURE — 99213 OFFICE O/P EST LOW 20 MIN: CPT | Performed by: INTERNAL MEDICINE

## 2020-10-27 PROCEDURE — G0463 HOSPITAL OUTPT CLINIC VISIT: HCPCS

## 2020-10-27 RX ORDER — CHOLECALCIFEROL (VITAMIN D3) 50 MCG
1 TABLET ORAL DAILY
COMMUNITY

## 2020-10-27 ASSESSMENT — ANXIETY QUESTIONNAIRES
6. BECOMING EASILY ANNOYED OR IRRITABLE: NOT AT ALL
5. BEING SO RESTLESS THAT IT IS HARD TO SIT STILL: NOT AT ALL
GAD7 TOTAL SCORE: 0
3. WORRYING TOO MUCH ABOUT DIFFERENT THINGS: NOT AT ALL
2. NOT BEING ABLE TO STOP OR CONTROL WORRYING: NOT AT ALL
4. TROUBLE RELAXING: NOT AT ALL
7. FEELING AFRAID AS IF SOMETHING AWFUL MIGHT HAPPEN: NOT AT ALL
1. FEELING NERVOUS, ANXIOUS, OR ON EDGE: NOT AT ALL

## 2020-10-27 ASSESSMENT — PATIENT HEALTH QUESTIONNAIRE - PHQ9: SUM OF ALL RESPONSES TO PHQ QUESTIONS 1-9: 0

## 2020-10-27 ASSESSMENT — PAIN SCALES - GENERAL: PAINLEVEL: NO PAIN (0)

## 2020-10-27 NOTE — NURSING NOTE
"Chief Complaint   Patient presents with     Lipids     Hypertension       Initial /76 (BP Location: Left arm, Patient Position: Chair, Cuff Size: Adult Regular)   Pulse 66   Temp 97.3  F (36.3  C) (Tympanic)   Wt 73 kg (161 lb)   SpO2 98%   BMI 25.99 kg/m   Estimated body mass index is 25.99 kg/m  as calculated from the following:    Height as of 1/27/20: 1.676 m (5' 6\").    Weight as of this encounter: 73 kg (161 lb).  Medication Reconciliation: complete  MIKHAIL RODRIGUEZ LPN  "

## 2020-10-28 ASSESSMENT — ANXIETY QUESTIONNAIRES: GAD7 TOTAL SCORE: 0

## 2020-11-30 DIAGNOSIS — I10 ESSENTIAL HYPERTENSION: ICD-10-CM

## 2020-11-30 DIAGNOSIS — E78.5 HYPERLIPIDEMIA LDL GOAL <130: ICD-10-CM

## 2020-11-30 RX ORDER — LISINOPRIL 20 MG/1
TABLET ORAL
Qty: 90 TABLET | Refills: 3 | Status: SHIPPED | OUTPATIENT
Start: 2020-11-30 | End: 2021-11-23

## 2020-11-30 RX ORDER — SIMVASTATIN 20 MG
TABLET ORAL
Qty: 90 TABLET | Refills: 3 | Status: SHIPPED | OUTPATIENT
Start: 2020-11-30 | End: 2021-11-23

## 2020-11-30 NOTE — TELEPHONE ENCOUNTER
Simvastatin   Last Written Prescription Date:  10/29/2019  Last Fill Quantity: 90,   # refills: 3  Last Office Visit: 10/27/2020  Future Office visit:         Lisinopril       Last Written Prescription Date:  10/29/2019  Last Fill Quantity: 90,   # refills: 3  Last Office Visit: 10/27/2020  Future Office visit:

## 2021-10-28 ENCOUNTER — OFFICE VISIT (OUTPATIENT)
Dept: INTERNAL MEDICINE | Facility: OTHER | Age: 71
End: 2021-10-28
Attending: INTERNAL MEDICINE
Payer: MEDICARE

## 2021-10-28 VITALS
OXYGEN SATURATION: 95 % | TEMPERATURE: 97.4 F | DIASTOLIC BLOOD PRESSURE: 68 MMHG | WEIGHT: 165 LBS | SYSTOLIC BLOOD PRESSURE: 132 MMHG | BODY MASS INDEX: 26.63 KG/M2 | HEART RATE: 66 BPM

## 2021-10-28 DIAGNOSIS — Z12.5 SCREENING FOR PROSTATE CANCER: ICD-10-CM

## 2021-10-28 DIAGNOSIS — K64.4 EXTERNAL HEMORRHOIDS: ICD-10-CM

## 2021-10-28 DIAGNOSIS — E78.5 HYPERLIPIDEMIA LDL GOAL <130: ICD-10-CM

## 2021-10-28 DIAGNOSIS — I10 PRIMARY HYPERTENSION: Primary | ICD-10-CM

## 2021-10-28 DIAGNOSIS — R21 RASH: ICD-10-CM

## 2021-10-28 DIAGNOSIS — D70.8 OTHER NEUTROPENIA (H): Primary | ICD-10-CM

## 2021-10-28 LAB
ALBUMIN SERPL-MCNC: 4.2 G/DL (ref 3.4–5)
ALP SERPL-CCNC: 67 U/L (ref 40–150)
ALT SERPL W P-5'-P-CCNC: 29 U/L (ref 0–70)
ANION GAP SERPL CALCULATED.3IONS-SCNC: 6 MMOL/L (ref 3–14)
AST SERPL W P-5'-P-CCNC: 23 U/L (ref 0–45)
BASOPHILS # BLD AUTO: 0 10E3/UL (ref 0–0.2)
BASOPHILS NFR BLD AUTO: 1 %
BILIRUB SERPL-MCNC: 0.7 MG/DL (ref 0.2–1.3)
BUN SERPL-MCNC: 17 MG/DL (ref 7–30)
CALCIUM SERPL-MCNC: 9.2 MG/DL (ref 8.5–10.1)
CHLORIDE BLD-SCNC: 102 MMOL/L (ref 94–109)
CHOLEST SERPL-MCNC: 219 MG/DL
CO2 SERPL-SCNC: 26 MMOL/L (ref 20–32)
CREAT SERPL-MCNC: 0.9 MG/DL (ref 0.66–1.25)
EOSINOPHIL # BLD AUTO: 0.2 10E3/UL (ref 0–0.7)
EOSINOPHIL NFR BLD AUTO: 6 %
ERYTHROCYTE [DISTWIDTH] IN BLOOD BY AUTOMATED COUNT: 11.9 % (ref 10–15)
FASTING STATUS PATIENT QL REPORTED: YES
GFR SERPL CREATININE-BSD FRML MDRD: 87 ML/MIN/1.73M2
GLUCOSE BLD-MCNC: 110 MG/DL (ref 70–99)
HCT VFR BLD AUTO: 44.9 % (ref 40–53)
HDLC SERPL-MCNC: 90 MG/DL
HGB BLD-MCNC: 15.5 G/DL (ref 13.3–17.7)
LDLC SERPL CALC-MCNC: 119 MG/DL
LYMPHOCYTES # BLD AUTO: 1.3 10E3/UL (ref 0.8–5.3)
LYMPHOCYTES NFR BLD AUTO: 41 %
MCH RBC QN AUTO: 31.5 PG (ref 26.5–33)
MCHC RBC AUTO-ENTMCNC: 34.5 G/DL (ref 31.5–36.5)
MCV RBC AUTO: 91 FL (ref 78–100)
MONOCYTES # BLD AUTO: 0.4 10E3/UL (ref 0–1.3)
MONOCYTES NFR BLD AUTO: 13 %
NEUTROPHILS # BLD AUTO: 1.2 10E3/UL (ref 1.6–8.3)
NEUTROPHILS NFR BLD AUTO: 39 %
NONHDLC SERPL-MCNC: 129 MG/DL
PLATELET # BLD AUTO: 209 10E3/UL (ref 150–450)
POTASSIUM BLD-SCNC: 3.9 MMOL/L (ref 3.4–5.3)
PROT SERPL-MCNC: 7.7 G/DL (ref 6.8–8.8)
PSA SERPL-MCNC: 0.94 UG/L (ref 0–4)
RBC # BLD AUTO: 4.92 10E6/UL (ref 4.4–5.9)
SODIUM SERPL-SCNC: 134 MMOL/L (ref 133–144)
TRIGL SERPL-MCNC: 50 MG/DL
WBC # BLD AUTO: 3.1 10E3/UL (ref 4–11)

## 2021-10-28 PROCEDURE — G0463 HOSPITAL OUTPT CLINIC VISIT: HCPCS

## 2021-10-28 PROCEDURE — 80053 COMPREHEN METABOLIC PANEL: CPT | Mod: ZL | Performed by: INTERNAL MEDICINE

## 2021-10-28 PROCEDURE — 85025 COMPLETE CBC W/AUTO DIFF WBC: CPT | Mod: ZL | Performed by: INTERNAL MEDICINE

## 2021-10-28 PROCEDURE — 99214 OFFICE O/P EST MOD 30 MIN: CPT | Performed by: INTERNAL MEDICINE

## 2021-10-28 PROCEDURE — 80061 LIPID PANEL: CPT | Mod: ZL | Performed by: INTERNAL MEDICINE

## 2021-10-28 PROCEDURE — G0103 PSA SCREENING: HCPCS | Mod: ZL | Performed by: INTERNAL MEDICINE

## 2021-10-28 PROCEDURE — 36415 COLL VENOUS BLD VENIPUNCTURE: CPT | Mod: ZL | Performed by: INTERNAL MEDICINE

## 2021-10-28 ASSESSMENT — PAIN SCALES - GENERAL: PAINLEVEL: NO PAIN (0)

## 2021-10-28 NOTE — NURSING NOTE
"Chief Complaint   Patient presents with     Hypertension     Lipids       Initial /68 (BP Location: Left arm, Patient Position: Chair, Cuff Size: Adult Regular)   Pulse 66   Temp 97.4  F (36.3  C) (Tympanic)   Wt 74.8 kg (165 lb)   SpO2 95%   BMI 26.63 kg/m   Estimated body mass index is 26.63 kg/m  as calculated from the following:    Height as of 1/27/20: 1.676 m (5' 6\").    Weight as of this encounter: 74.8 kg (165 lb).  Medication Reconciliation: complete  MIKHAIL RODRIGUEZ LPN    "

## 2021-10-28 NOTE — PROGRESS NOTES
Assessment & Plan   Problem List Items Addressed This Visit     None      Visit Diagnoses     Primary hypertension    -  Primary    Relevant Orders    Comprehensive metabolic panel (BMP + Alb, Alk Phos, ALT, AST, Total. Bili, TP)    CBC with platelets and differential (Completed)    Hyperlipidemia LDL goal <130        Relevant Orders    Comprehensive metabolic panel (BMP + Alb, Alk Phos, ALT, AST, Total. Bili, TP)    Lipid Profile (Chol, Trig, HDL, LDL calc)    Screening for prostate cancer        Relevant Orders    PSA, screen    Rash        External hemorrhoids                 20 minutes spent on the date of the encounter doing chart review, review of test results, interpretation of tests, patient visit and documentation            No follow-ups on file.    Linwood Russell, [facility]    Sean Christianson is a 71 year old who presents for the following health issues     HPI   Sammy presents today for follow up.  He states he is doing well and has some questions regarding which booster to get.  In addition he reports a rash on medial left ankle at times that is itchy.  It is not very obvious today.  In addition he states he had some trouble with hemorrhoids in months past but that has resolved now.      Hyperlipidemia Follow-Up      Are you regularly taking any medication or supplement to lower your cholesterol?   Yes- Zocor    Are you having muscle aches or other side effects that you think could be caused by your cholesterol lowering medication?  No    Hypertension Follow-up      Do you check your blood pressure regularly outside of the clinic? Yes     Are you following a low salt diet? Yes    Are your blood pressures ever more than 140 on the top number (systolic) OR more   than 90 on the bottom number (diastolic), for example 140/90? No      Review of Systems   Constitutional, HEENT, cardiovascular, pulmonary, gi and gu systems are negative, except as otherwise noted.       Objective    /68 (BP Location: Left arm, Patient Position: Chair, Cuff Size: Adult Regular)   Pulse 66   Temp 97.4  F (36.3  C) (Tympanic)   Wt 74.8 kg (165 lb)   SpO2 95%   BMI 26.63 kg/m    Body mass index is 26.63 kg/m .  Physical Exam   GENERAL: healthy, alert and no distress  EYES: Eyes grossly normal to inspection, PERRL and conjunctivae and sclerae normal  HENT: ear canals and TM's normal, nose and mouth without ulcers or lesions  RESP: lungs clear to auscultation - no rales, rhonchi or wheezes  CV: regular rate and rhythm, normal S1 S2, no S3 or S4, no murmur, click or rub, no peripheral edema and peripheral pulses strong  ABDOMEN: soft, nontender, no hepatosplenomegaly, no masses and bowel sounds normal  MS: no gross musculoskeletal defects noted, no edema  SKIN: no suspicious lesions or rashes  NEURO: Normal strength and tone, mentation intact and speech normal  PSYCH: mentation appears normal, affect normal/bright    Office Visit on 10/27/2020   Component Date Value Ref Range Status     Sodium 10/27/2020 137  133 - 144 mmol/L Final     Potassium 10/27/2020 4.2  3.4 - 5.3 mmol/L Final     Chloride 10/27/2020 104  94 - 109 mmol/L Final     Carbon Dioxide 10/27/2020 29  20 - 32 mmol/L Final     Anion Gap 10/27/2020 4  3 - 14 mmol/L Final     Glucose 10/27/2020 112* 70 - 99 mg/dL Final    Fasting specimen     Urea Nitrogen 10/27/2020 13  7 - 30 mg/dL Final     Creatinine 10/27/2020 0.86  0.66 - 1.25 mg/dL Final     GFR Estimate 10/27/2020 89  >60 mL/min/[1.73_m2] Final    Comment: Non  GFR Calc  Starting 12/18/2018, serum creatinine based estimated GFR (eGFR) will be   calculated using the Chronic Kidney Disease Epidemiology Collaboration   (CKD-EPI) equation.       GFR Estimate If Black 10/27/2020 >90  >60 mL/min/[1.73_m2] Final    Comment:  GFR Calc  Starting 12/18/2018, serum creatinine based estimated GFR (eGFR) will be   calculated using the Chronic Kidney  Disease Epidemiology Collaboration   (CKD-EPI) equation.       Calcium 10/27/2020 9.3  8.5 - 10.1 mg/dL Final     Bilirubin Total 10/27/2020 0.7  0.2 - 1.3 mg/dL Final     Albumin 10/27/2020 4.2  3.4 - 5.0 g/dL Final     Protein Total 10/27/2020 7.5  6.8 - 8.8 g/dL Final     Alkaline Phosphatase 10/27/2020 61  40 - 150 U/L Final     ALT 10/27/2020 28  0 - 70 U/L Final     AST 10/27/2020 20  0 - 45 U/L Final     WBC 10/27/2020 3.6* 4.0 - 11.0 10e9/L Final     RBC Count 10/27/2020 4.75  4.4 - 5.9 10e12/L Final     Hemoglobin 10/27/2020 14.9  13.3 - 17.7 g/dL Final     Hematocrit 10/27/2020 43.7  40.0 - 53.0 % Final     MCV 10/27/2020 92  78 - 100 fl Final     MCH 10/27/2020 31.4  26.5 - 33.0 pg Final     MCHC 10/27/2020 34.1  31.5 - 36.5 g/dL Final     RDW 10/27/2020 11.9  10.0 - 15.0 % Final     Platelet Count 10/27/2020 194  150 - 450 10e9/L Final     % Neutrophils 10/27/2020 43.7  % Final     % Lymphocytes 10/27/2020 38.2  % Final     % Monocytes 10/27/2020 10.3  % Final     % Eosinophils 10/27/2020 6.7  % Final     % Basophils 10/27/2020 1.1  % Final     Absolute Neutrophil 10/27/2020 1.6  1.6 - 8.3 10e9/L Final     Absolute Lymphocytes 10/27/2020 1.4  0.8 - 5.3 10e9/L Final     Absolute Monocytes 10/27/2020 0.4  0.0 - 1.3 10e9/L Final     Absolute Eosinophils 10/27/2020 0.2  0.0 - 0.7 10e9/L Final     Absolute Basophils 10/27/2020 0.0  0.0 - 0.2 10e9/L Final     Diff Method 10/27/2020 Automated Method   Final     Cholesterol 10/27/2020 190  <200 mg/dL Final     Triglycerides 10/27/2020 61  <150 mg/dL Final    Fasting specimen     HDL Cholesterol 10/27/2020 91  >39 mg/dL Final     LDL Cholesterol Calculated 10/27/2020 87  <100 mg/dL Final    Desirable:       <100 mg/dl     Non HDL Cholesterol 10/27/2020 99  <130 mg/dL Final     Results for orders placed or performed in visit on 10/28/21   CBC with platelets and differential     Status: Abnormal   Result Value Ref Range    WBC Count 3.1 (L) 4.0 - 11.0 10e3/uL     RBC Count 4.92 4.40 - 5.90 10e6/uL    Hemoglobin 15.5 13.3 - 17.7 g/dL    Hematocrit 44.9 40.0 - 53.0 %    MCV 91 78 - 100 fL    MCH 31.5 26.5 - 33.0 pg    MCHC 34.5 31.5 - 36.5 g/dL    RDW 11.9 10.0 - 15.0 %    Platelet Count 209 150 - 450 10e3/uL    % Neutrophils 39 %    % Lymphocytes 41 %    % Monocytes 13 %    % Eosinophils 6 %    % Basophils 1 %    Absolute Neutrophils 1.2 (L) 1.6 - 8.3 10e3/uL    Absolute Lymphocytes 1.3 0.8 - 5.3 10e3/uL    Absolute Monocytes 0.4 0.0 - 1.3 10e3/uL    Absolute Eosinophils 0.2 0.0 - 0.7 10e3/uL    Absolute Basophils 0.0 0.0 - 0.2 10e3/uL   CBC with platelets and differential     Status: Abnormal    Narrative    The following orders were created for panel order CBC with platelets and differential.  Procedure                               Abnormality         Status                     ---------                               -----------         ------                     CBC with platelets and d...[[id number]]  Abnormal            Final result                 Please view results for these tests on the individual orders.     Results for orders placed or performed in visit on 10/28/21 (from the past 24 hour(s))   CBC with platelets and differential    Narrative    The following orders were created for panel order CBC with platelets and differential.  Procedure                               Abnormality         Status                     ---------                               -----------         ------                     CBC with platelets and d...[[id number]]  Abnormal            Final result                 Please view results for these tests on the individual orders.   CBC with platelets and differential   Result Value Ref Range    WBC Count 3.1 (L) 4.0 - 11.0 10e3/uL    RBC Count 4.92 4.40 - 5.90 10e6/uL    Hemoglobin 15.5 13.3 - 17.7 g/dL    Hematocrit 44.9 40.0 - 53.0 %    MCV 91 78 - 100 fL    MCH 31.5 26.5 - 33.0 pg    MCHC 34.5 31.5 - 36.5 g/dL    RDW 11.9 10.0 - 15.0 %     Platelet Count 209 150 - 450 10e3/uL    % Neutrophils 39 %    % Lymphocytes 41 %    % Monocytes 13 %    % Eosinophils 6 %    % Basophils 1 %    Absolute Neutrophils 1.2 (L) 1.6 - 8.3 10e3/uL    Absolute Lymphocytes 1.3 0.8 - 5.3 10e3/uL    Absolute Monocytes 0.4 0.0 - 1.3 10e3/uL    Absolute Eosinophils 0.2 0.0 - 0.7 10e3/uL    Absolute Basophils 0.0 0.0 - 0.2 10e3/uL

## 2021-11-23 DIAGNOSIS — E78.5 HYPERLIPIDEMIA LDL GOAL <130: ICD-10-CM

## 2021-11-23 DIAGNOSIS — I10 ESSENTIAL HYPERTENSION: ICD-10-CM

## 2021-11-23 RX ORDER — SIMVASTATIN 20 MG
TABLET ORAL
Qty: 90 TABLET | Refills: 3 | Status: SHIPPED | OUTPATIENT
Start: 2021-11-23 | End: 2022-11-03

## 2021-11-23 RX ORDER — LISINOPRIL 20 MG/1
TABLET ORAL
Qty: 90 TABLET | Refills: 3 | Status: SHIPPED | OUTPATIENT
Start: 2021-11-23 | End: 2022-11-03

## 2021-11-29 ENCOUNTER — LAB (OUTPATIENT)
Dept: LAB | Facility: OTHER | Age: 71
End: 2021-11-29
Payer: MEDICARE

## 2021-11-29 DIAGNOSIS — D70.8 OTHER NEUTROPENIA (H): ICD-10-CM

## 2021-11-29 DIAGNOSIS — D70.8 OTHER NEUTROPENIA (H): Primary | ICD-10-CM

## 2021-11-29 LAB
BASOPHILS # BLD AUTO: 0 10E3/UL (ref 0–0.2)
BASOPHILS NFR BLD AUTO: 1 %
EOSINOPHIL # BLD AUTO: 0.2 10E3/UL (ref 0–0.7)
EOSINOPHIL NFR BLD AUTO: 7 %
ERYTHROCYTE [DISTWIDTH] IN BLOOD BY AUTOMATED COUNT: 11.7 % (ref 10–15)
HCT VFR BLD AUTO: 47.4 % (ref 40–53)
HGB BLD-MCNC: 16.2 G/DL (ref 13.3–17.7)
HOLD SPECIMEN: NORMAL
LYMPHOCYTES # BLD AUTO: 1.4 10E3/UL (ref 0.8–5.3)
LYMPHOCYTES NFR BLD AUTO: 41 %
MCH RBC QN AUTO: 31.2 PG (ref 26.5–33)
MCHC RBC AUTO-ENTMCNC: 34.2 G/DL (ref 31.5–36.5)
MCV RBC AUTO: 91 FL (ref 78–100)
MONOCYTES # BLD AUTO: 0.4 10E3/UL (ref 0–1.3)
MONOCYTES NFR BLD AUTO: 12 %
NEUTROPHILS # BLD AUTO: 1.3 10E3/UL (ref 1.6–8.3)
NEUTROPHILS NFR BLD AUTO: 39 %
PLATELET # BLD AUTO: 201 10E3/UL (ref 150–450)
RBC # BLD AUTO: 5.2 10E6/UL (ref 4.4–5.9)
WBC # BLD AUTO: 3.3 10E3/UL (ref 4–11)

## 2021-11-29 PROCEDURE — 85025 COMPLETE CBC W/AUTO DIFF WBC: CPT | Mod: ZL

## 2021-11-29 PROCEDURE — 36415 COLL VENOUS BLD VENIPUNCTURE: CPT | Mod: ZL

## 2022-11-02 NOTE — PROGRESS NOTES
Assessment & Plan   Problem List Items Addressed This Visit    None  Visit Diagnoses     Screening for prostate cancer    -  Primary    Relevant Orders    PSA, screen    Essential hypertension        Relevant Medications    lisinopril (ZESTRIL) 20 MG tablet    Other Relevant Orders    CBC with platelets and differential    Hyperlipidemia LDL goal <130        Relevant Medications    simvastatin (ZOCOR) 20 MG tablet    Other Relevant Orders    Comprehensive metabolic panel (BMP + Alb, Alk Phos, ALT, AST, Total. Bili, TP)    Lipid Profile (Chol, Trig, HDL, LDL calc)    Need for tetanus booster        Relevant Orders    TDAP VACCINE (Adacel, Boostrix)  [[id number]] (Completed)    Vitamin D deficiency        Relevant Orders    Vitamin D Deficiency    Other eczema        Relevant Medications    triamcinolone (KENALOG) 0.1 % external cream             20 minutes spent on the date of the encounter doing chart review, review of test results, interpretation of tests, patient visit and documentation            No follow-ups on file.    Linwood Russell, [facility]   Sammy is a 72 year old, presenting for the following health issues:  Recheck Medication      HPI      Sammy presents today for routine follow up.  He needs some medications refills.  He has no specific concerns outside of a rash on his calves consistent with eczema.  He denies any chest pain, SOB, abdominal pain.   He questions whether he needs his Vit D so he discontinued it a couple months ago.       Hyperlipidemia Follow-Up      Are you regularly taking any medication or supplement to lower your cholesterol?   Yes- simvastatin     Are you having muscle aches or other side effects that you think could be caused by your cholesterol lowering medication?  No    Hypertension Follow-up      Do you check your blood pressure regularly outside of the clinic? No     Are you following a low salt diet? Yes    Are your blood  pressures ever more than 140 on the top number (systolic) OR more   than 90 on the bottom number (diastolic), for example 140/90? No            Review of Systems   Constitutional, HEENT, cardiovascular, pulmonary, gi and gu systems are negative, except as otherwise noted.      Objective    /84 (BP Location: Left arm, Patient Position: Sitting, Cuff Size: Adult Regular)   Pulse 76   Temp 97.2  F (36.2  C) (Tympanic)   Resp 18   Wt 73.2 kg (161 lb 6.4 oz)   SpO2 98%   BMI 26.05 kg/m    Body mass index is 26.05 kg/m .  Physical Exam   GENERAL: healthy, alert and no distress  RESP: lungs clear to auscultation - no rales, rhonchi or wheezes  CV: regular rate and rhythm, normal S1 S2, no S3 or S4, no murmur, click or rub, no peripheral edema and peripheral pulses strong  ABDOMEN: soft, nontender, no hepatosplenomegaly, no masses and bowel sounds normal  MS: no gross musculoskeletal defects noted, no edema  SKIN: Left calf eczema  NEURO: Normal strength and tone, mentation intact and speech normal  PSYCH: mentation appears normal, affect normal/bright    Lab on 11/29/2021   Component Date Value Ref Range Status     WBC Count 11/29/2021 3.3 (L)  4.0 - 11.0 10e3/uL Final     RBC Count 11/29/2021 5.20  4.40 - 5.90 10e6/uL Final     Hemoglobin 11/29/2021 16.2  13.3 - 17.7 g/dL Final     Hematocrit 11/29/2021 47.4  40.0 - 53.0 % Final     MCV 11/29/2021 91  78 - 100 fL Final     MCH 11/29/2021 31.2  26.5 - 33.0 pg Final     MCHC 11/29/2021 34.2  31.5 - 36.5 g/dL Final     RDW 11/29/2021 11.7  10.0 - 15.0 % Final     Platelet Count 11/29/2021 201  150 - 450 10e3/uL Final     % Neutrophils 11/29/2021 39  % Final     % Lymphocytes 11/29/2021 41  % Final     % Monocytes 11/29/2021 12  % Final     % Eosinophils 11/29/2021 7  % Final     % Basophils 11/29/2021 1  % Final     Absolute Neutrophils 11/29/2021 1.3 (L)  1.6 - 8.3 10e3/uL Final     Absolute Lymphocytes 11/29/2021 1.4  0.8 - 5.3 10e3/uL Final     Absolute  Monocytes 11/29/2021 0.4  0.0 - 1.3 10e3/uL Final     Absolute Eosinophils 11/29/2021 0.2  0.0 - 0.7 10e3/uL Final     Absolute Basophils 11/29/2021 0.0  0.0 - 0.2 10e3/uL Final     Hold Specimen 11/29/2021 [facility]   Final     No results found for any visits on 11/03/22.  No results found for this or any previous visit (from the past 24 hour(s)).

## 2022-11-03 ENCOUNTER — OFFICE VISIT (OUTPATIENT)
Dept: INTERNAL MEDICINE | Facility: OTHER | Age: 72
End: 2022-11-03
Attending: INTERNAL MEDICINE
Payer: MEDICARE

## 2022-11-03 VITALS
HEART RATE: 76 BPM | TEMPERATURE: 97.2 F | OXYGEN SATURATION: 98 % | SYSTOLIC BLOOD PRESSURE: 134 MMHG | BODY MASS INDEX: 26.05 KG/M2 | WEIGHT: 161.4 LBS | DIASTOLIC BLOOD PRESSURE: 84 MMHG | RESPIRATION RATE: 18 BRPM

## 2022-11-03 DIAGNOSIS — Z12.5 SCREENING FOR PROSTATE CANCER: Primary | ICD-10-CM

## 2022-11-03 DIAGNOSIS — E78.5 HYPERLIPIDEMIA LDL GOAL <130: ICD-10-CM

## 2022-11-03 DIAGNOSIS — I10 ESSENTIAL HYPERTENSION: ICD-10-CM

## 2022-11-03 DIAGNOSIS — Z23 NEED FOR TETANUS BOOSTER: ICD-10-CM

## 2022-11-03 DIAGNOSIS — L30.8 OTHER ECZEMA: ICD-10-CM

## 2022-11-03 DIAGNOSIS — E55.9 VITAMIN D DEFICIENCY: ICD-10-CM

## 2022-11-03 LAB
ALBUMIN SERPL BCG-MCNC: 4.7 G/DL (ref 3.5–5.2)
ALP SERPL-CCNC: 62 U/L (ref 40–129)
ALT SERPL W P-5'-P-CCNC: 26 U/L (ref 10–50)
ANION GAP SERPL CALCULATED.3IONS-SCNC: 12 MMOL/L (ref 7–15)
AST SERPL W P-5'-P-CCNC: 33 U/L (ref 10–50)
BASOPHILS # BLD AUTO: 0 10E3/UL (ref 0–0.2)
BASOPHILS NFR BLD AUTO: 1 %
BILIRUB SERPL-MCNC: 0.6 MG/DL
BUN SERPL-MCNC: 19.2 MG/DL (ref 8–23)
CALCIUM SERPL-MCNC: 9.3 MG/DL (ref 8.8–10.2)
CHLORIDE SERPL-SCNC: 99 MMOL/L (ref 98–107)
CHOLEST SERPL-MCNC: 209 MG/DL
CREAT SERPL-MCNC: 0.87 MG/DL (ref 0.67–1.17)
DEPRECATED HCO3 PLAS-SCNC: 26 MMOL/L (ref 22–29)
EOSINOPHIL # BLD AUTO: 0.2 10E3/UL (ref 0–0.7)
EOSINOPHIL NFR BLD AUTO: 6 %
ERYTHROCYTE [DISTWIDTH] IN BLOOD BY AUTOMATED COUNT: 11.9 % (ref 10–15)
GFR SERPL CREATININE-BSD FRML MDRD: >90 ML/MIN/1.73M2
GLUCOSE SERPL-MCNC: 107 MG/DL (ref 70–99)
HCT VFR BLD AUTO: 43.6 % (ref 40–53)
HDLC SERPL-MCNC: 81 MG/DL
HGB BLD-MCNC: 15.2 G/DL (ref 13.3–17.7)
LDLC SERPL CALC-MCNC: 119 MG/DL
LYMPHOCYTES # BLD AUTO: 1.4 10E3/UL (ref 0.8–5.3)
LYMPHOCYTES NFR BLD AUTO: 42 %
MCH RBC QN AUTO: 32.3 PG (ref 26.5–33)
MCHC RBC AUTO-ENTMCNC: 34.9 G/DL (ref 31.5–36.5)
MCV RBC AUTO: 93 FL (ref 78–100)
MONOCYTES # BLD AUTO: 0.5 10E3/UL (ref 0–1.3)
MONOCYTES NFR BLD AUTO: 14 %
NEUTROPHILS # BLD AUTO: 1.2 10E3/UL (ref 1.6–8.3)
NEUTROPHILS NFR BLD AUTO: 37 %
NONHDLC SERPL-MCNC: 128 MG/DL
PLATELET # BLD AUTO: 195 10E3/UL (ref 150–450)
POTASSIUM SERPL-SCNC: 4.4 MMOL/L (ref 3.4–5.3)
PROT SERPL-MCNC: 7.1 G/DL (ref 6.4–8.3)
PSA SERPL-MCNC: 0.95 NG/ML (ref 0–6.5)
RBC # BLD AUTO: 4.71 10E6/UL (ref 4.4–5.9)
SODIUM SERPL-SCNC: 137 MMOL/L (ref 136–145)
TRIGL SERPL-MCNC: 46 MG/DL
WBC # BLD AUTO: 3.3 10E3/UL (ref 4–11)

## 2022-11-03 PROCEDURE — G0463 HOSPITAL OUTPT CLINIC VISIT: HCPCS | Mod: 25 | Performed by: INTERNAL MEDICINE

## 2022-11-03 PROCEDURE — 85025 COMPLETE CBC W/AUTO DIFF WBC: CPT | Mod: ZL | Performed by: INTERNAL MEDICINE

## 2022-11-03 PROCEDURE — 82306 VITAMIN D 25 HYDROXY: CPT | Mod: ZL | Performed by: INTERNAL MEDICINE

## 2022-11-03 PROCEDURE — 80053 COMPREHEN METABOLIC PANEL: CPT | Mod: ZL | Performed by: INTERNAL MEDICINE

## 2022-11-03 PROCEDURE — G0103 PSA SCREENING: HCPCS | Mod: ZL | Performed by: INTERNAL MEDICINE

## 2022-11-03 PROCEDURE — 90714 TD VACC NO PRESV 7 YRS+ IM: CPT

## 2022-11-03 PROCEDURE — 99214 OFFICE O/P EST MOD 30 MIN: CPT | Performed by: INTERNAL MEDICINE

## 2022-11-03 PROCEDURE — 36415 COLL VENOUS BLD VENIPUNCTURE: CPT | Mod: ZL | Performed by: INTERNAL MEDICINE

## 2022-11-03 PROCEDURE — 80061 LIPID PANEL: CPT | Mod: ZL | Performed by: INTERNAL MEDICINE

## 2022-11-03 RX ORDER — LISINOPRIL 20 MG/1
20 TABLET ORAL DAILY
Qty: 90 TABLET | Refills: 3 | Status: SHIPPED | OUTPATIENT
Start: 2022-11-03 | End: 2023-10-30

## 2022-11-03 RX ORDER — SIMVASTATIN 20 MG
20 TABLET ORAL DAILY
Qty: 90 TABLET | Refills: 3 | Status: SHIPPED | OUTPATIENT
Start: 2022-11-03 | End: 2023-10-30

## 2022-11-03 RX ORDER — TRIAMCINOLONE ACETONIDE 1 MG/G
CREAM TOPICAL 2 TIMES DAILY
Qty: 30 G | Refills: 0 | Status: SHIPPED | OUTPATIENT
Start: 2022-11-03 | End: 2023-03-07

## 2022-11-03 ASSESSMENT — PAIN SCALES - GENERAL: PAINLEVEL: NO PAIN (0)

## 2022-11-04 LAB — DEPRECATED CALCIDIOL+CALCIFEROL SERPL-MC: 36 UG/L (ref 20–75)

## 2023-03-06 DIAGNOSIS — L30.8 OTHER ECZEMA: ICD-10-CM

## 2023-03-06 NOTE — TELEPHONE ENCOUNTER
Trimacinolone      Last Written Prescription Date:  11/3/22  Last Fill Quantity: 30g,   # refills: 0  Last Office Visit: 11/3/22  Future Office visit:       Routing refill request to provider for review/approval because:  NEEDS TO STATE WHERE ON THE BODY IT GOES

## 2023-03-07 RX ORDER — TRIAMCINOLONE ACETONIDE 1 MG/G
CREAM TOPICAL 2 TIMES DAILY
Qty: 30 G | Refills: 0 | Status: SHIPPED | OUTPATIENT
Start: 2023-03-07

## 2023-10-28 DIAGNOSIS — E78.5 HYPERLIPIDEMIA LDL GOAL <130: ICD-10-CM

## 2023-10-28 DIAGNOSIS — I10 ESSENTIAL HYPERTENSION: ICD-10-CM

## 2023-10-30 ENCOUNTER — OFFICE VISIT (OUTPATIENT)
Dept: INTERNAL MEDICINE | Facility: OTHER | Age: 73
End: 2023-10-30
Attending: INTERNAL MEDICINE
Payer: COMMERCIAL

## 2023-10-30 VITALS
TEMPERATURE: 97.7 F | BODY MASS INDEX: 25.66 KG/M2 | SYSTOLIC BLOOD PRESSURE: 124 MMHG | RESPIRATION RATE: 18 BRPM | DIASTOLIC BLOOD PRESSURE: 72 MMHG | OXYGEN SATURATION: 97 % | HEART RATE: 68 BPM | WEIGHT: 159 LBS

## 2023-10-30 DIAGNOSIS — I10 PRIMARY HYPERTENSION: ICD-10-CM

## 2023-10-30 DIAGNOSIS — E78.5 HYPERLIPIDEMIA LDL GOAL <100: ICD-10-CM

## 2023-10-30 DIAGNOSIS — Z00.00 ROUTINE HISTORY AND PHYSICAL EXAMINATION OF ADULT: Primary | ICD-10-CM

## 2023-10-30 DIAGNOSIS — Z13.220 SCREENING FOR HYPERLIPIDEMIA: ICD-10-CM

## 2023-10-30 PROCEDURE — G0463 HOSPITAL OUTPT CLINIC VISIT: HCPCS

## 2023-10-30 PROCEDURE — 99213 OFFICE O/P EST LOW 20 MIN: CPT | Performed by: INTERNAL MEDICINE

## 2023-10-30 RX ORDER — LISINOPRIL 20 MG/1
20 TABLET ORAL DAILY
Qty: 90 TABLET | Refills: 0 | Status: SHIPPED | OUTPATIENT
Start: 2023-10-30 | End: 2024-01-23

## 2023-10-30 RX ORDER — SIMVASTATIN 20 MG
20 TABLET ORAL DAILY
Qty: 90 TABLET | Refills: 0 | Status: SHIPPED | OUTPATIENT
Start: 2023-10-30 | End: 2024-01-23

## 2023-10-30 ASSESSMENT — PAIN SCALES - GENERAL: PAINLEVEL: NO PAIN (0)

## 2023-10-30 NOTE — TELEPHONE ENCOUNTER
Lisinopril (Zestril) 20 MG tablet    Last Written Prescription Date:  11/03/2022  Last Fill Quantity: 90,   # refills: 3  Last Office Visit: 11/03/2022       Simvastatin (Zocor) 20 MG tablet     Last Written Prescription Date:  11/03/2022  Last Fill Quantity: 90,   # refills: 3

## 2023-10-30 NOTE — PROGRESS NOTES
Assessment & Plan   Problem List Items Addressed This Visit    None  Visit Diagnoses       Routine history and physical examination of adult    -  Primary    Relevant Orders    CBC with platelets and differential    Comprehensive metabolic panel (BMP + Alb, Alk Phos, ALT, AST, Total. Bili, TP)    Hyperlipidemia LDL goal <100        Relevant Orders    Lipid Profile (Chol, Trig, HDL, LDL calc)    Screening for hyperlipidemia        Relevant Orders    PSA, screen    Primary hypertension                   20 minutes spent by me on the date of the encounter doing chart review, review of test results, interpretation of tests, patient visit, and documentation            No follow-ups on file.    Linwood Russell, DO  [facility]   Sammy is a 73 year old, presenting for the following health issues:  Lipids and Hypertension      HPI     Sammy presents today for routine follow-up.  He denies any complaints.  He is due for routine labs in the near future.  I informed him that he likely could not obtain his routine labs until after November 3, 2023 as to avoid any insurance charges especially in regard to his PSA.    Hyperlipidemia Follow-Up    Are you regularly taking any medication or supplement to lower your cholesterol?   Yes- simvastatin   Are you having muscle aches or other side effects that you think could be caused by your cholesterol lowering medication?  No    Hypertension Follow-up    Do you check your blood pressure regularly outside of the clinic? No   Are you following a low salt diet? Yes  Are your blood pressures ever more than 140 on the top number (systolic) OR more   than 90 on the bottom number (diastolic), for example 140/90? No      Review of Systems   Constitutional, HEENT, cardiovascular, pulmonary, gi and gu systems are negative, except as otherwise noted.      Objective    /72 (BP Location: Right arm, Patient Position: Sitting, Cuff Size: Adult  Regular)   Pulse 68   Temp 97.7  F (36.5  C) (Tympanic)   Resp 18   Wt 72.1 kg (159 lb)   SpO2 97%   BMI 25.66 kg/m    Body mass index is 25.66 kg/m .  Physical Exam   GENERAL: healthy, alert and no distress  EYES: Eyes grossly normal to inspection, PERRL and conjunctivae and sclerae normal  HENT: ear canals and TM's normal, nose and mouth without ulcers or lesions  RESP: lungs clear to auscultation - no rales, rhonchi or wheezes  CV: regular rate and rhythm, normal S1 S2, no S3 or S4, no murmur, click or rub, no peripheral edema and peripheral pulses strong  ABDOMEN: Soft positive bowel sounds.  MS: no gross musculoskeletal defects noted, no edema  SKIN: no suspicious lesions or rashes  NEURO: Normal strength and tone, mentation intact and speech normal  PSYCH: mentation appears normal, affect normal/bright    Office Visit on 11/03/2022   Component Date Value Ref Range Status    Sodium 11/03/2022 137  136 - 145 mmol/L Final    Potassium 11/03/2022 4.4  3.4 - 5.3 mmol/L Final    Chloride 11/03/2022 99  98 - 107 mmol/L Final    Carbon Dioxide (CO2) 11/03/2022 26  22 - 29 mmol/L Final    Anion Gap 11/03/2022 12  7 - 15 mmol/L Final    Urea Nitrogen 11/03/2022 19.2  8.0 - 23.0 mg/dL Final    Creatinine 11/03/2022 0.87  0.67 - 1.17 mg/dL Final    Calcium 11/03/2022 9.3  8.8 - 10.2 mg/dL Final    Glucose 11/03/2022 107 (H)  70 - 99 mg/dL Final    Alkaline Phosphatase 11/03/2022 62  40 - 129 U/L Final    AST 11/03/2022 33  10 - 50 U/L Final    ALT 11/03/2022 26  10 - 50 U/L Final    Protein Total 11/03/2022 7.1  6.4 - 8.3 g/dL Final    Albumin 11/03/2022 4.7  3.5 - 5.2 g/dL Final    Bilirubin Total 11/03/2022 0.6  <=1.2 mg/dL Final    GFR Estimate 11/03/2022 >90  >60 mL/min/1.73m2 Final    Effective December 21, 2021 eGFRcr in adults is calculated using the 2021 CKD-EPI creatinine equation which includes age and gender (Ag et al., NEJM, DOI: 10.1056/VVVBqs3398667)    Cholesterol 11/03/2022 209 (H)  <200 mg/dL  Final    Triglycerides 11/03/2022 46  <150 mg/dL Final    Direct Measure HDL 11/03/2022 81  >=40 mg/dL Final    LDL Cholesterol Calculated 11/03/2022 119 (H)  <=100 mg/dL Final    Non HDL Cholesterol 11/03/2022 128  <130 mg/dL Final    Prostate Specific Antigen Screen 11/03/2022 0.95  0.00 - 6.50 ng/mL Final    Vitamin D, Total (25-Hydroxy) 11/03/2022 36  20 - 75 ug/L Final    WBC Count 11/03/2022 3.3 (L)  4.0 - 11.0 10e3/uL Final    RBC Count 11/03/2022 4.71  4.40 - 5.90 10e6/uL Final    Hemoglobin 11/03/2022 15.2  13.3 - 17.7 g/dL Final    Hematocrit 11/03/2022 43.6  40.0 - 53.0 % Final    MCV 11/03/2022 93  78 - 100 fL Final    MCH 11/03/2022 32.3  26.5 - 33.0 pg Final    MCHC 11/03/2022 34.9  31.5 - 36.5 g/dL Final    RDW 11/03/2022 11.9  10.0 - 15.0 % Final    Platelet Count 11/03/2022 195  150 - 450 10e3/uL Final    % Neutrophils 11/03/2022 37  % Final    % Lymphocytes 11/03/2022 42  % Final    % Monocytes 11/03/2022 14  % Final    % Eosinophils 11/03/2022 6  % Final    % Basophils 11/03/2022 1  % Final    Absolute Neutrophils 11/03/2022 1.2 (L)  1.6 - 8.3 10e3/uL Final    Absolute Lymphocytes 11/03/2022 1.4  0.8 - 5.3 10e3/uL Final    Absolute Monocytes 11/03/2022 0.5  0.0 - 1.3 10e3/uL Final    Absolute Eosinophils 11/03/2022 0.2  0.0 - 0.7 10e3/uL Final    Absolute Basophils 11/03/2022 0.0  0.0 - 0.2 10e3/uL Final     No results found for any visits on 10/30/23.  No results found for this or any previous visit (from the past 24 hour(s)).

## 2023-11-08 ENCOUNTER — LAB (OUTPATIENT)
Dept: LAB | Facility: OTHER | Age: 73
End: 2023-11-08
Payer: MEDICARE

## 2023-11-08 DIAGNOSIS — E78.5 HYPERLIPIDEMIA LDL GOAL <100: ICD-10-CM

## 2023-11-08 DIAGNOSIS — Z00.00 ROUTINE HISTORY AND PHYSICAL EXAMINATION OF ADULT: ICD-10-CM

## 2023-11-08 DIAGNOSIS — Z13.220 SCREENING FOR HYPERLIPIDEMIA: ICD-10-CM

## 2023-11-08 LAB
ALBUMIN SERPL BCG-MCNC: 4.8 G/DL (ref 3.5–5.2)
ALP SERPL-CCNC: 61 U/L (ref 40–129)
ALT SERPL W P-5'-P-CCNC: 30 U/L (ref 0–70)
ANION GAP SERPL CALCULATED.3IONS-SCNC: 12 MMOL/L (ref 7–15)
AST SERPL W P-5'-P-CCNC: 31 U/L (ref 0–45)
BASOPHILS # BLD AUTO: 0.1 10E3/UL (ref 0–0.2)
BASOPHILS NFR BLD AUTO: 2 %
BILIRUB SERPL-MCNC: 0.4 MG/DL
BUN SERPL-MCNC: 19.8 MG/DL (ref 8–23)
CALCIUM SERPL-MCNC: 9.4 MG/DL (ref 8.8–10.2)
CHLORIDE SERPL-SCNC: 99 MMOL/L (ref 98–107)
CHOLEST SERPL-MCNC: 206 MG/DL
CREAT SERPL-MCNC: 0.86 MG/DL (ref 0.67–1.17)
DEPRECATED HCO3 PLAS-SCNC: 26 MMOL/L (ref 22–29)
EGFRCR SERPLBLD CKD-EPI 2021: >90 ML/MIN/1.73M2
EOSINOPHIL # BLD AUTO: 0.3 10E3/UL (ref 0–0.7)
EOSINOPHIL NFR BLD AUTO: 8 %
ERYTHROCYTE [DISTWIDTH] IN BLOOD BY AUTOMATED COUNT: 11.7 % (ref 10–15)
GLUCOSE SERPL-MCNC: 100 MG/DL (ref 70–99)
HCT VFR BLD AUTO: 44.5 % (ref 40–53)
HDLC SERPL-MCNC: 78 MG/DL
HGB BLD-MCNC: 15.3 G/DL (ref 13.3–17.7)
IMM GRANULOCYTES # BLD: 0 10E3/UL
IMM GRANULOCYTES NFR BLD: 0 %
LDLC SERPL CALC-MCNC: 117 MG/DL
LYMPHOCYTES # BLD AUTO: 1.5 10E3/UL (ref 0.8–5.3)
LYMPHOCYTES NFR BLD AUTO: 38 %
MCH RBC QN AUTO: 31.5 PG (ref 26.5–33)
MCHC RBC AUTO-ENTMCNC: 34.4 G/DL (ref 31.5–36.5)
MCV RBC AUTO: 92 FL (ref 78–100)
MONOCYTES # BLD AUTO: 0.4 10E3/UL (ref 0–1.3)
MONOCYTES NFR BLD AUTO: 11 %
NEUTROPHILS # BLD AUTO: 1.6 10E3/UL (ref 1.6–8.3)
NEUTROPHILS NFR BLD AUTO: 42 %
NONHDLC SERPL-MCNC: 128 MG/DL
PLATELET # BLD AUTO: 187 10E3/UL (ref 150–450)
POTASSIUM SERPL-SCNC: 4.5 MMOL/L (ref 3.4–5.3)
PROT SERPL-MCNC: 7.1 G/DL (ref 6.4–8.3)
PSA SERPL DL<=0.01 NG/ML-MCNC: 0.97 NG/ML (ref 0–6.5)
RBC # BLD AUTO: 4.86 10E6/UL (ref 4.4–5.9)
SODIUM SERPL-SCNC: 137 MMOL/L (ref 135–145)
TRIGL SERPL-MCNC: 54 MG/DL
WBC # BLD AUTO: 3.9 10E3/UL (ref 4–11)

## 2023-11-08 PROCEDURE — 80053 COMPREHEN METABOLIC PANEL: CPT | Mod: ZL

## 2023-11-08 PROCEDURE — G0103 PSA SCREENING: HCPCS | Mod: ZL

## 2023-11-08 PROCEDURE — 36415 COLL VENOUS BLD VENIPUNCTURE: CPT | Mod: ZL

## 2023-11-08 PROCEDURE — 80061 LIPID PANEL: CPT | Mod: ZL

## 2023-11-08 PROCEDURE — 85004 AUTOMATED DIFF WBC COUNT: CPT | Mod: ZL

## 2024-01-23 DIAGNOSIS — E78.5 HYPERLIPIDEMIA LDL GOAL <130: ICD-10-CM

## 2024-01-23 DIAGNOSIS — I10 ESSENTIAL HYPERTENSION: ICD-10-CM

## 2024-01-23 RX ORDER — LISINOPRIL 20 MG/1
20 TABLET ORAL DAILY
Qty: 90 TABLET | Refills: 0 | Status: SHIPPED | OUTPATIENT
Start: 2024-01-23 | End: 2024-04-25

## 2024-01-23 RX ORDER — SIMVASTATIN 20 MG
20 TABLET ORAL DAILY
Qty: 90 TABLET | Refills: 2 | Status: SHIPPED | OUTPATIENT
Start: 2024-01-23

## 2024-01-23 NOTE — TELEPHONE ENCOUNTER
Lisinopril      Last Written Prescription Date:  10/30/23  Last Fill Quantity: 90,   # refills: 0  Last Office Visit: 10/30/23  Future Office visit:       Routing refill request to provider for review/approval because:      Simvastatin      Last Written Prescription Date:  10/30/23  Last Fill Quantity: 90,   # refills: 0  Last Office Visit: 10/30/23  Future Office visit:       Routing refill request to provider for review/approval because:

## 2024-04-25 DIAGNOSIS — I10 ESSENTIAL HYPERTENSION: ICD-10-CM

## 2024-04-25 RX ORDER — LISINOPRIL 20 MG/1
20 TABLET ORAL DAILY
Qty: 90 TABLET | Refills: 1 | Status: SHIPPED | OUTPATIENT
Start: 2024-04-25

## 2024-05-15 ENCOUNTER — TELEPHONE (OUTPATIENT)
Dept: INTERNAL MEDICINE | Facility: OTHER | Age: 74
End: 2024-05-15

## 2024-10-22 DIAGNOSIS — E78.5 HYPERLIPIDEMIA LDL GOAL <130: ICD-10-CM

## 2024-10-22 DIAGNOSIS — I10 ESSENTIAL HYPERTENSION: ICD-10-CM

## 2024-10-22 RX ORDER — LISINOPRIL 20 MG/1
20 TABLET ORAL DAILY
Qty: 90 TABLET | Refills: 0 | Status: SHIPPED | OUTPATIENT
Start: 2024-10-22 | End: 2024-11-13

## 2024-10-22 RX ORDER — SIMVASTATIN 20 MG
20 TABLET ORAL DAILY
Qty: 90 TABLET | Refills: 0 | Status: SHIPPED | OUTPATIENT
Start: 2024-10-22 | End: 2024-11-13

## 2024-11-13 ENCOUNTER — OFFICE VISIT (OUTPATIENT)
Dept: INTERNAL MEDICINE | Facility: OTHER | Age: 74
End: 2024-11-13
Attending: INTERNAL MEDICINE
Payer: MEDICARE

## 2024-11-13 VITALS
DIASTOLIC BLOOD PRESSURE: 72 MMHG | BODY MASS INDEX: 25.82 KG/M2 | SYSTOLIC BLOOD PRESSURE: 126 MMHG | HEART RATE: 80 BPM | WEIGHT: 160 LBS | TEMPERATURE: 97.2 F | OXYGEN SATURATION: 96 % | RESPIRATION RATE: 18 BRPM

## 2024-11-13 DIAGNOSIS — E78.5 HYPERLIPIDEMIA LDL GOAL <130: ICD-10-CM

## 2024-11-13 DIAGNOSIS — E78.5 HYPERLIPIDEMIA LDL GOAL <100: ICD-10-CM

## 2024-11-13 DIAGNOSIS — Z12.5 SCREENING FOR PROSTATE CANCER: ICD-10-CM

## 2024-11-13 DIAGNOSIS — I10 ESSENTIAL HYPERTENSION: ICD-10-CM

## 2024-11-13 DIAGNOSIS — Z00.00 ROUTINE HISTORY AND PHYSICAL EXAMINATION OF ADULT: ICD-10-CM

## 2024-11-13 LAB
ALBUMIN SERPL BCG-MCNC: 4.8 G/DL (ref 3.5–5.2)
ALP SERPL-CCNC: 64 U/L (ref 40–150)
ALT SERPL W P-5'-P-CCNC: 25 U/L (ref 0–70)
ANION GAP SERPL CALCULATED.3IONS-SCNC: 14 MMOL/L (ref 7–15)
AST SERPL W P-5'-P-CCNC: 34 U/L (ref 0–45)
BASOPHILS # BLD AUTO: 0.1 10E3/UL (ref 0–0.2)
BASOPHILS NFR BLD AUTO: 2 %
BILIRUB SERPL-MCNC: 0.6 MG/DL
BUN SERPL-MCNC: 13.5 MG/DL (ref 8–23)
CALCIUM SERPL-MCNC: 9.9 MG/DL (ref 8.8–10.4)
CHLORIDE SERPL-SCNC: 99 MMOL/L (ref 98–107)
CHOLEST SERPL-MCNC: 205 MG/DL
CREAT SERPL-MCNC: 0.9 MG/DL (ref 0.67–1.17)
EGFRCR SERPLBLD CKD-EPI 2021: >90 ML/MIN/1.73M2
EOSINOPHIL # BLD AUTO: 0.2 10E3/UL (ref 0–0.7)
EOSINOPHIL NFR BLD AUTO: 6 %
ERYTHROCYTE [DISTWIDTH] IN BLOOD BY AUTOMATED COUNT: 11.7 % (ref 10–15)
FASTING STATUS PATIENT QL REPORTED: YES
FASTING STATUS PATIENT QL REPORTED: YES
GLUCOSE SERPL-MCNC: 96 MG/DL (ref 70–99)
HCO3 SERPL-SCNC: 26 MMOL/L (ref 22–29)
HCT VFR BLD AUTO: 45.3 % (ref 40–53)
HDLC SERPL-MCNC: 84 MG/DL
HGB BLD-MCNC: 15.6 G/DL (ref 13.3–17.7)
IMM GRANULOCYTES # BLD: 0 10E3/UL
IMM GRANULOCYTES NFR BLD: 0 %
LDLC SERPL CALC-MCNC: 111 MG/DL
LYMPHOCYTES # BLD AUTO: 1.2 10E3/UL (ref 0.8–5.3)
LYMPHOCYTES NFR BLD AUTO: 38 %
MCH RBC QN AUTO: 31.4 PG (ref 26.5–33)
MCHC RBC AUTO-ENTMCNC: 34.4 G/DL (ref 31.5–36.5)
MCV RBC AUTO: 91 FL (ref 78–100)
MONOCYTES # BLD AUTO: 0.4 10E3/UL (ref 0–1.3)
MONOCYTES NFR BLD AUTO: 11 %
NEUTROPHILS # BLD AUTO: 1.4 10E3/UL (ref 1.6–8.3)
NEUTROPHILS NFR BLD AUTO: 43 %
NONHDLC SERPL-MCNC: 121 MG/DL
PLATELET # BLD AUTO: 197 10E3/UL (ref 150–450)
POTASSIUM SERPL-SCNC: 4.4 MMOL/L (ref 3.4–5.3)
PROT SERPL-MCNC: 7.2 G/DL (ref 6.4–8.3)
PSA SERPL DL<=0.01 NG/ML-MCNC: 1.05 NG/ML (ref 0–6.5)
RBC # BLD AUTO: 4.97 10E6/UL (ref 4.4–5.9)
SODIUM SERPL-SCNC: 139 MMOL/L (ref 135–145)
TRIGL SERPL-MCNC: 49 MG/DL
WBC # BLD AUTO: 3.3 10E3/UL (ref 4–11)

## 2024-11-13 PROCEDURE — 82947 ASSAY GLUCOSE BLOOD QUANT: CPT | Mod: ZL | Performed by: INTERNAL MEDICINE

## 2024-11-13 PROCEDURE — G0463 HOSPITAL OUTPT CLINIC VISIT: HCPCS

## 2024-11-13 PROCEDURE — 85004 AUTOMATED DIFF WBC COUNT: CPT | Mod: ZL | Performed by: INTERNAL MEDICINE

## 2024-11-13 PROCEDURE — 84155 ASSAY OF PROTEIN SERUM: CPT | Mod: ZL | Performed by: INTERNAL MEDICINE

## 2024-11-13 PROCEDURE — 82465 ASSAY BLD/SERUM CHOLESTEROL: CPT | Mod: ZL | Performed by: INTERNAL MEDICINE

## 2024-11-13 PROCEDURE — 36415 COLL VENOUS BLD VENIPUNCTURE: CPT | Mod: ZL | Performed by: INTERNAL MEDICINE

## 2024-11-13 PROCEDURE — G0103 PSA SCREENING: HCPCS | Mod: ZL | Performed by: INTERNAL MEDICINE

## 2024-11-13 RX ORDER — LISINOPRIL 20 MG/1
20 TABLET ORAL DAILY
Qty: 90 TABLET | Refills: 3 | Status: SHIPPED | OUTPATIENT
Start: 2024-11-13

## 2024-11-13 RX ORDER — SIMVASTATIN 20 MG
20 TABLET ORAL DAILY
Qty: 90 TABLET | Refills: 3 | Status: SHIPPED | OUTPATIENT
Start: 2024-11-13

## 2024-11-13 ASSESSMENT — PAIN SCALES - GENERAL: PAINLEVEL_OUTOF10: NO PAIN (0)

## 2024-11-13 NOTE — PROGRESS NOTES
Preventive Care Visit  [facility]  Linwood DAWKINSEduardo Russell DO, Internal Medicine  Nov 13, 2024      Assessment & Plan   Problem List Items Addressed This Visit    None  Visit Diagnoses       Routine history and physical examination of adult        Relevant Orders    Comprehensive metabolic panel (BMP + Alb, Alk Phos, ALT, AST, Total. Bili, TP)    Lipid Profile (Chol, Trig, HDL, LDL calc)    CBC with platelets and differential    Screening for prostate cancer        Relevant Orders    PSA, screen    Hyperlipidemia LDL goal <100        Relevant Medications    simvastatin (ZOCOR) 20 MG tablet    Other Relevant Orders    Comprehensive metabolic panel (BMP + Alb, Alk Phos, ALT, AST, Total. Bili, TP)    Lipid Profile (Chol, Trig, HDL, LDL calc)    Essential hypertension        Relevant Medications    lisinopril (ZESTRIL) 20 MG tablet    Hyperlipidemia LDL goal <130        Relevant Medications    simvastatin (ZOCOR) 20 MG tablet                   Subjective   Sammy is a 74 year old, presenting for the following:  No chief complaint on file.            Healthy Habits:     Taking medications regularly:  0  History of Present Illness       Reason for visit:  Physical   He is taking medications regularly.      Sammy is a 74-year-old male with a history of hypertension hyperlipidemia who presents to clinic today for routine annual physical.  He has no specific complaints at this time.  He does have some questions surrounding establishment of care with a new provider upon my departure.  He denies any chest pain or shortness of breath.  He remains quite active.  Was made aware that he is due for tetanus shot however was not covered under his Medicare plan here and would have to get it at a pharmacy.      Health Care Directive  Patient does not have a Health Care Directive: Patient states has Advance Directive and will bring in a copy to clinic.       No data to display                   No data to display                    No data to display                  10/30/2023   Social Factors   Worry food won't last until get money to buy more No   Food not last or not have enough money for food? No   Do you have housing? (Housing is defined as stable permanent housing and does not include staying ouside in a car, in a tent, in an abandoned building, in an overnight shelter, or couch-surfing.) Yes   Are you worried about losing your housing? No   Lack of transportation? No   Unable to get utilities (heat,electricity)? No            2024   Fall Risk   Fallen 2 or more times in the past year? No    Trouble with walking or balance? No        Patient-reported           No data to display                   No data to display                   No data to display                     No data to display                     Today's PHQ-2 Score:       2024     8:18 AM   PHQ-2 (  [employer])   Q1: Little interest or pleasure in doing things 0    Q2: Feeling down, depressed or hopeless 0    PHQ-2 Score 0    Q1: Little interest or pleasure in doing things Not at all   Q2: Feeling down, depressed or hopeless Not at all   PHQ-2 Score 0       Patient-reported            No data to display              Social History     Tobacco Use    Smoking status: Never    Smokeless tobacco: Never   Substance Use Topics    Alcohol use: Yes     Comment: weekly       ASCVD Risk   The ASCVD Risk score (Chema LICONA, et al., 2019) failed to calculate for the following reasons:    The systolic blood pressure is missing    Fracture Risk Assessment Tool  Link to Frax Calculator  Use the information below to complete the Frax calculator  : 1950  Sex: male  Weight (kg): 72.6 kg (actual weight)  Height (cm): 0 cm  Previous Fragility Fracture:  No  History of parent with fractured hip:  No  Current Smoking:  No  Patient has been on glucocorticoids for more than 3 months (5mg/day or more): No  Rheumatoid Arthritis on Problem List:  No  Secondary Osteoporosis  on Problem List:  No  Consumes 3 or more units of alcohol per day: No  Femoral Neck BMD (g/cm2)            Reviewed and updated as needed this visit by Provider                    Past Medical History:   Diagnosis Date    Hypertension     Neutropenia (H) 5/31/2016     Past Surgical History:   Procedure Laterality Date    BIOPSY TONGUE      colonoscopy  12/20/2017 2002,2007,2012,2017- Dr. Holloway     COLONOSCOPY      US PROSTATE WITH BIOPSY      XR LUMBAR PUNCTURE SPINAL TAP DIAGNOSTIC       Lab work is in process  Labs reviewed in EPIC  BP Readings from Last 3 Encounters:   11/13/24 126/72   10/30/23 124/72   11/03/22 134/84    Wt Readings from Last 3 Encounters:   11/13/24 72.6 kg (160 lb)   10/30/23 72.1 kg (159 lb)   11/03/22 73.2 kg (161 lb 6.4 oz)                  Patient Active Problem List   Diagnosis    Neutropenia (H)     Past Surgical History:   Procedure Laterality Date    BIOPSY TONGUE      colonoscopy  12/20/2017 2002,2007,2012,2017- Dr. Holloway     COLONOSCOPY      US PROSTATE WITH BIOPSY      XR LUMBAR PUNCTURE SPINAL TAP DIAGNOSTIC         Social History     Tobacco Use    Smoking status: Never    Smokeless tobacco: Never   Substance Use Topics    Alcohol use: Yes     Comment: weekly     Family History   Problem Relation Age of Onset    Coronary Artery Disease Mother 85    Hyperlipidemia Mother     Hypertension Mother     Myocardial Infarction Mother     Alzheimer Disease Father 93    Albinism Father     Dementia Father     Cancer Father     Prostate Cancer Father     Asthma Brother     Diabetes No family hx of     Cerebrovascular Disease No family hx of     Breast Cancer No family hx of     Colon Cancer No family hx of     Thyroid Disease No family hx of     Genetic Disorder No family hx of     Anesthesia Reaction No family hx of          Current Outpatient Medications   Medication Sig Dispense Refill    aspirin (ASA) 81 MG chewable tablet Take 81 mg by mouth daily      Glucosamine HCl  "(GLUCOSAMINE PO) Take 2 tablets by mouth daily      lisinopril (ZESTRIL) 20 MG tablet Take 1 tablet (20 mg) by mouth daily. 90 tablet 3    simvastatin (ZOCOR) 20 MG tablet Take 1 tablet (20 mg) by mouth daily. 90 tablet 3     No Known Allergies  Current providers sharing in care for this patient include:  Patient Care Team:  Linwood Russell DO as PCP - General (Internal Medicine)  Linwood Russell DO as Assigned PCP    The following health maintenance items are reviewed in Epic and correct as of today:  Health Maintenance   Topic Date Due    Pneumococcal Vaccine: 65+ Years (1 of 2 - PCV) Never done    HEPATITIS C SCREENING  Never done    RSV VACCINE (1 - Risk 60-74 years 1-dose series) Never done    MEDICARE ANNUAL WELLNESS VISIT  Never done    DTAP/TDAP/TD IMMUNIZATION (1 - Tdap) 11/04/2022    ADVANCE CARE PLANNING  10/29/2024    BMP  11/08/2024    LIPID  11/08/2024    COVID-19 Vaccine (8 - 2024-25 season) 12/02/2024    FALL RISK ASSESSMENT  11/13/2025    GLUCOSE  11/08/2026    COLORECTAL CANCER SCREENING  12/20/2027    PHQ-2 (once per calendar year)  Completed    INFLUENZA VACCINE  Completed    ZOSTER IMMUNIZATION  Completed    HPV IMMUNIZATION  Aged Out    MENINGITIS IMMUNIZATION  Aged Out    RSV MONOCLONAL ANTIBODY  Aged Out         Review of Systems  Constitutional, neuro, ENT, endocrine, pulmonary, cardiac, gastrointestinal, genitourinary, musculoskeletal, integument and psychiatric systems are negative, except as otherwise noted.     Objective    Exam  There were no vitals taken for this visit.   Estimated body mass index is 25.66 kg/m  as calculated from the following:    Height as of 1/27/20: 1.676 m (5' 6\").    Weight as of 10/30/23: 72.1 kg (159 lb).    Physical Exam  GENERAL: alert and no distress  EYES: Eyes grossly normal to inspection, PERRL and conjunctivae and sclerae normal  HENT: ear canals and TM's normal, nose and mouth without ulcers or lesions  RESP: lungs clear to auscultation " - no rales, rhonchi or wheezes  CV: regular rate and rhythm, normal S1 S2, no S3 or S4, no murmur, click or rub, no peripheral edema  ABDOMEN: soft, nontender, no hepatosplenomegaly, no masses and bowel sounds normal  MS: no gross musculoskeletal defects noted, no edema  SKIN: no suspicious lesions or rashes  PSYCH: mentation appears normal, affect normal/bright         No data to display                       Signed Electronically by: Linwood Russell, DO